# Patient Record
Sex: MALE | Race: WHITE
[De-identification: names, ages, dates, MRNs, and addresses within clinical notes are randomized per-mention and may not be internally consistent; named-entity substitution may affect disease eponyms.]

---

## 2020-08-05 ENCOUNTER — HOSPITAL ENCOUNTER (EMERGENCY)
Dept: HOSPITAL 75 - ER | Age: 30
Discharge: HOME | End: 2020-08-05
Payer: SELF-PAY

## 2020-08-05 VITALS — SYSTOLIC BLOOD PRESSURE: 129 MMHG | DIASTOLIC BLOOD PRESSURE: 85 MMHG

## 2020-08-05 VITALS — HEIGHT: 68.11 IN | WEIGHT: 276.68 LBS | BODY MASS INDEX: 41.93 KG/M2

## 2020-08-05 DIAGNOSIS — F41.9: Primary | ICD-10-CM

## 2020-08-05 DIAGNOSIS — E66.9: ICD-10-CM

## 2020-08-05 LAB
ALBUMIN SERPL-MCNC: 4.6 GM/DL (ref 3.2–4.5)
ALP SERPL-CCNC: 65 U/L (ref 40–136)
ALT SERPL-CCNC: 48 U/L (ref 0–55)
BASOPHILS # BLD AUTO: 0 10^3/UL (ref 0–0.1)
BASOPHILS NFR BLD AUTO: 0 % (ref 0–10)
BILIRUB SERPL-MCNC: 0.5 MG/DL (ref 0.1–1)
BUN/CREAT SERPL: 20
CALCIUM SERPL-MCNC: 9.4 MG/DL (ref 8.5–10.1)
CHLORIDE SERPL-SCNC: 106 MMOL/L (ref 98–107)
CO2 SERPL-SCNC: 20 MMOL/L (ref 21–32)
CREAT SERPL-MCNC: 0.9 MG/DL (ref 0.6–1.3)
EOSINOPHIL # BLD AUTO: 0.1 10^3/UL (ref 0–0.3)
EOSINOPHIL NFR BLD AUTO: 1 % (ref 0–10)
ERYTHROCYTE [DISTWIDTH] IN BLOOD BY AUTOMATED COUNT: 12.9 % (ref 10–14.5)
GFR SERPLBLD BASED ON 1.73 SQ M-ARVRAT: > 60 ML/MIN
GLUCOSE SERPL-MCNC: 95 MG/DL (ref 70–105)
HCT VFR BLD CALC: 45 % (ref 40–54)
HGB BLD-MCNC: 15 G/DL (ref 13.3–17.7)
LYMPHOCYTES # BLD AUTO: 1.6 X 10^3 (ref 1–4)
LYMPHOCYTES NFR BLD AUTO: 18 % (ref 12–44)
MANUAL DIFFERENTIAL PERFORMED BLD QL: NO
MCH RBC QN AUTO: 29 PG (ref 25–34)
MCHC RBC AUTO-ENTMCNC: 34 G/DL (ref 32–36)
MCV RBC AUTO: 85 FL (ref 80–99)
MONOCYTES # BLD AUTO: 0.7 X 10^3 (ref 0–1)
MONOCYTES NFR BLD AUTO: 8 % (ref 0–12)
NEUTROPHILS # BLD AUTO: 6.5 X 10^3 (ref 1.8–7.8)
NEUTROPHILS NFR BLD AUTO: 73 % (ref 42–75)
PLATELET # BLD: 193 10^3/UL (ref 130–400)
PMV BLD AUTO: 11 FL (ref 7.4–10.4)
POTASSIUM SERPL-SCNC: 3.6 MMOL/L (ref 3.6–5)
PROT SERPL-MCNC: 8 GM/DL (ref 6.4–8.2)
SODIUM SERPL-SCNC: 138 MMOL/L (ref 135–145)
WBC # BLD AUTO: 9 10^3/UL (ref 4.3–11)

## 2020-08-05 PROCEDURE — 85379 FIBRIN DEGRADATION QUANT: CPT

## 2020-08-05 PROCEDURE — 93005 ELECTROCARDIOGRAM TRACING: CPT

## 2020-08-05 PROCEDURE — 36415 COLL VENOUS BLD VENIPUNCTURE: CPT

## 2020-08-05 PROCEDURE — 85025 COMPLETE CBC W/AUTO DIFF WBC: CPT

## 2020-08-05 PROCEDURE — 71045 X-RAY EXAM CHEST 1 VIEW: CPT

## 2020-08-05 PROCEDURE — 80053 COMPREHEN METABOLIC PANEL: CPT

## 2020-08-05 PROCEDURE — 84484 ASSAY OF TROPONIN QUANT: CPT

## 2020-08-05 NOTE — XMS REPORT
[facility]

                             Created on: 2017



Alex Ness

External Reference #: [id number]

: 1990

Sex: Male



Demographics





                          Address                   [address] [place]  [address]

 

                          Preferred Language        Unknown

 

                          Marital Status            Unknown

 

                          Spiritism Affiliation     Unknown

 

                          Race                      Unknown

 

                          Ethnic Group              Unknown





Author





                          Author                    Alex BOYD

 

                          [facility]

 

                          Address                   [address] [place]  [address]



 

                          Phone                     [phone]







Care Team Providers





                    Care Team Member Name Role                Phone

 

                    ANDRES BOYD       Unavailable         [phone]







PROBLEMS





          Type      Condition ICD9-CM Code HXR33-WP Code Onset Dates Condition S

tatus SNOMED 

Code

 

          Problem   Snoring             R06.83              Active    81732541

 

          Problem   Family history of diabetes mellitus           Z83.3         

      Active    302629832

 

          Problem   Type 2 diabetes mellitus without complication           E11.

9               Active    44923316

 

          Problem   Elevated liver enzymes           R74.8               Active 

   [id number]

 

          Problem   Daytime hypersomnia           G47.19              Active    

[id number]

 

          Problem   Benign essential hypertension           I10                 

Active    0287814







ALLERGIES

Unknown Allergies



SOCIAL HISTORY

No smoking Hx information available



PLAN OF CARE





VITAL SIGNS





MEDICATIONS

Unknown Medications



RESULTS

No Results



PROCEDURES

No Known procedures



IMMUNIZATIONS

No Known Immunizations

## 2020-08-05 NOTE — XMS REPORT
[facility]

                             Created on: 2019



Alex Ness

External Reference #: [id number]

: 1990

Sex: Male



Demographics





                          Address                   [address] [place]  [address]

 

                          Preferred Language        Unknown

 

                          Marital Status            Unknown

 

                          Evangelical Affiliation     Unknown

 

                          Race                      Unknown

 

                          Ethnic Group              Unknown





Author





                          Author                    Alex MENJIVAR

 

                          Organization              [facility] [place]

 

                          Address                   [address] [place]  [address]



 

                          Phone                     [phone]







Care Team Providers





                    Care Team Member Name Role                Phone

 

                    KEVIN MENJIVAR     Unavailable         [phone]







PROBLEMS





          Type      Condition ICD9-CM Code AKC17-TQ Code Onset Dates Condition S

tatus SNOMED 

Code

 

          Problem   Snoring             R06.83              Active    63737381

 

          Problem   Hypertriglyceridemia           E78.1               Active   

 636607164

 

          Problem   Elevated liver enzymes           R74.8               Active 

   135268843

 

          Problem   Type 2 diabetes mellitus without complication           E11.

9               Active    99130168

 

          Problem   Benign essential hypertension           I10                 

Active    7346704

 

          Problem   Daytime hypersomnia           G47.19              Active    

[id number]







ALLERGIES

No Information



ENCOUNTERS





                Encounter       Location        Date            Diagnosis

 

                          [facility] [place]     [address] [facility] [id number]

[address] [place] [address]

                          05 Sep, 2019               

 

                          [facility] [place]     [address] [facility] [id number]

[address] [place] [address]

                          23 Aug, 2019               

 

                          [facility] [place]     [address] [facility] [id number]

[address] [place] [address]

                          19 Aug, 2019               

 

                          [facility] [place]     [address] [facility] [id number]

[address] [place] [address]

                          13 Aug, 2019              Hypertriglyceridemia E78.1

 

                          [facility] [place]     [address] [facility] [id number]

[address] [place] [address]

                          06 Aug, 2019               

 

                          [facility] [place]     [address] [facility] [id number]

[address] [place] [address]

                          02 Aug, 2019              Type 2 diabetes mellitus wit

hout complication E11.9 ; Fatigue 

R53.83 ; Morbid obesity E66.01 and Benign essential hypertension I10

 

                          [facility] [place]     [address] [facility] [id number]

[address] [place] [address]

                                         

 

                          [facility] [place]     [address] [address]

                                        Type 2 diabetes mellitus wit

hout complication E11.9 and Fatigue 

R53.83

 

                          [place] [address] [address]

                                         

 

                          [place] [address] [address]

                                         

 

                          [facility]  [address] 

[address]                07 Mar, 2019              Strep pharyngitis J02.0 ; Si

de pain R10.9 and Morbid 

obesity E66.01

 

                          [place] [address] [address]

                          03 2018              Coughing R05 ; Type 2 diabet

es mellitus without complication E11.9 

; Benign essential hypertension I10 ; Right otitis media with effusion H65.91 ; 
Acute upper respiratory infection, unspecified J06.9 and BMI 45.0-49.9, adult 
Z68.42

 

                          [facility] [place] [address] 

[address]                04 2017              Sore throat J02.9 ; Strep ph

aryngitis J02.0 and BMI 

40.0-44.9, adult Z68.41

 

                          [place] [address] [address]

                          03 Oct, 2017              Type 2 diabetes mellitus wit

hout complication E11.9 and Benign 

essential hypertension I10

 

                          [place] [address] [address]

                          15 May, 2017               

 

                          [place] [address] [address]

                                         

 

                          [facility]  [address] 

[address]                              Pharyngitis due to other org

anism J02.8

 

                          [place] [address] [address]

                                         

 

                          [facility] [place]     [address] [facility] [id number]

[address] [place] [address]

                                         

 

                          [facility] [place]     [address] [facility] [id number]

[address] [place] [address]

                                         

 

                          [facility] [place]     [address] [facility] [id number]

[address] [place] [address]

                                         

 

                          [facility] [place]     [address] [facility] [id number]

[address] [place] [address]

                                        Elevated liver enzymes R74.8

 

                          [facility] [place]     [address] [facility] [id number]

[address] [place] [address]

                                         

 

                          [facility] [place]     [address] [place] [id number]

[address] [place] [address]

                                         

 

                          [facility] [place]     [address] [place] [id number]

[address] [place] [address]

                                        Benign essential hypertensio

n I10 ; Type 2 diabetes mellitus 

without complication E11.9 and Elevated liver enzymes R74.8

 

                          [facility]  [address] [facility] [id number]

[address] [place] [address] 

[address]                               

 

                          [facility] [place]     [address] [facility] [id number]

[address] [place] [address]

                                         

 

                          [facility] [place]     [address] [facility] [id number]

[address] [place] [address]

                                        Elevated liver enzymes R74.8

 and Type 2 diabetes mellitus without 

complication E11.9

 

                          [facility] [place]     [address] [facility] [id number]

[address] [place] [address]

                          29 Mar, 2016              Benign essential hypertensio

n I10 ; Fatigue R53.83 ; Family history

of diabetes mellitus Z83.3 ; Snoring R06.83 and Daytime hypersomnia G47.19

 

                          [facility]  [address] [facility] [id number]

[address] [place] [address] 

[address]                09 Mar, 2016              Benign essential hypertensio

n I10 ; Vomiting R11.10 ; 

Bronchitis J40 ; Headache R51 and Strep throat J02.0

 

                          [facility]  [address] [id number]

[address] [place] [address], [place] 

[address]                10 Dec, 2015              Acute bronchitis J20.9 and H

[place] (hypertension) I10

 

                          [facility] [place]     [address] [id number]

[address] [place] [address], [place] [address]

                          20 Aug, 2015               

 

                          [facility] [place]     [address] [id number]

[address] [place] [address], [place] [address]

                                         

 

                          [facility] [place]     [address] [id number]

[address] [place] [address], [place] [address]

                                         

 

                          [facility] [place]     [address] [id number]

[address] [place] [address], [place] [address]

                          28 Oct, 2014               

 

                          [facility] [place]     [address] [id number]

[address] [place] [address], [place] [address]

                          28 Oct, 2014               

 

                          [facility] [place]     [address] [id number]

[address] [place] [address], [place] [address]

                          19 Sep, 2014               

 

                          [facility] [place]     [address] [id number]

[address] [place] [address], [place] [address]

                          19 Sep, 2014               

 

                          [facility] [place]     [address] [id number]

[address] [place] [address], [place] [address]

                          27 Aug, 2014               

 

                          [facility] [place]     [address] [id number]

[address] [place] [phone]

                          27 Aug, 2014               

 

                          [facility] [place]     [address] [id number]

[address] [place] [address], [place] [address]

                          27 Aug, 2014               

 

                          [facility] [place]     [address] [id number]

[address] [place] [address]

                          27 Aug, 2014               

 

                          [facility] [place]     [address] [id number]

[address] [place] [address]

                                         

 

                          [facility] [place]     [address] [id number]

[address] [place] [address]

                                         

 

                          [facility] [place]     [address] [id number]

[address] [place] [address]

                                         

 

                          [facility] [place]     [address] [facility] [id number]

[address] [place] [address]

                                         

 

                          [facility] [place]     [address] [facility] [id number]

[address] [place] [address]

                                         







IMMUNIZATIONS

No Known Immunizations



SOCIAL HISTORY

Never Assessed



REASON FOR VISIT





PLAN OF CARE





VITAL SIGNS





MEDICATIONS

Unknown Medications



RESULTS

No Results



PROCEDURES

No Known procedures



INSTRUCTIONS





MEDICATIONS ADMINISTERED

No Known Medications



MEDICAL (GENERAL) HISTORY





                    Type                Description         Date

 

                    Medical History     HTN                  

 

                    Medical History     Diabetes Mellitus 3/2016 A1C 9.9  

 

                    Medical History     Elevated liver enzymes  

 

                    Surgical History    No Surgical history information

## 2020-08-05 NOTE — XMS REPORT
[facility]

                             Created on: 05/15/2020



Alex Ness

External Reference #: [id number]

: 1990

Sex: Male



Demographics





                          Address                   [address] [place]  [address]

 

                          Preferred Language        Unknown

 

                          Marital Status            Unknown

 

                          Catholic Affiliation     Unknown

 

                          Race                      Unknown

 

                          Ethnic Group              Unknown





Author





                          Author                    Alex Mayorga Doctor

 

                          Organization              [facility]

 

                          Address                   Unknown

 

                          Phone                     Unavailable







Care Team Providers





                    Care Team Member Name Role                Phone

 

                    Migration,  Doctor  Unavailable         Unavailable







PROBLEMS





          Type      Condition ICD9-CM Code JYO37-UH Code Onset Dates Condition S

tatus SNOMED 

Code

 

          Problem   Elevated liver enzymes           R74.8               Active 

   220085281

 

          Problem   Hypertriglyceridemia           E78.1               Active   

 [id number]

 

          Problem   Obesity, morbid, BMI 40.0-49.9           E66.01             

 Active    [id number]

 

          Problem   Type 2 diabetes mellitus without complication           E11.

9               Active    30673062

 

          Problem   Benign essential hypertension           I10                 

Active    1013035

 

          Problem   Daytime hypersomnia           G47.19              Active    

[id number]

 

          Problem   Snoring             R06.83              Active    12549228







ALLERGIES

No Information



ENCOUNTERS





                Encounter       Location        Date            Diagnosis

 

                          [facility] [place]     [id number]

[address] [place] [address]

                                         

 

                          [facility] [place] [place] [facility]     [id number]

[address] [place] [address]

                          07 May, 2020               

 

                          [facility] [place] [place] [facility]     [id number]

[address] [place] [address]

                                        Type 2 diabetes mellitus wit

hout complication E11.9 ; Benign 

essential hypertension I10 ; Hypertriglyceridemia E78.1 and Obesity, morbid, BMI
40.0-49.9 E66.01

 

                          [facility] [place] [place] [facility]     [id number]

[address] [place] [address]

                                        Type 2 diabetes mellitus wit

hout complication E11.9 and Benign 

essential hypertension I10

 

                          [facility] [place] [place] [facility]     [id number]

[address] [place] [address]

                                         

 

                          [facility] [place]     [id number]

[address] [place] [address]

                                         

 

                          [facility] [place]     [id number]

[address] [place] [address]

                          05 Sep, 2019               

 

                          [facility] [place] [place] [facility]     [address] [facility] [id number]

[address] [place] [address]

                          23 Aug, 2019               

 

                          [facility] [place]     [address] [facility] [id number]

[address] [place] [address]

                          19 Aug, 2019               

 

                          [facility] [place]     [address] [facility] [id number]

[address] [place] [address]

                          13 Aug, 2019              Hypertriglyceridemia E78.1

 

                          [facility] [place]     [address] [facility] [id number]

[address] [place] [address]

                          06 Aug, 2019               

 

                          [facility] [place]     [address] [facility] [id number]

[address] [place] [address]

                          02 Aug, 2019              Type 2 diabetes mellitus wit

hout complication E11.9 ; Fatigue 

R53.83 ; Morbid obesity E66.01 and Benign essential hypertension I10

 

                          [address] [facility] [id number]

[address] [place] [address]

                                         

 

                          [facility] [place]     [id number]

[address] [place] [address]

                                        Type 2 diabetes mellitus wit

hout complication E11.9 and Fatigue 

R53.83

 

                          [facility] [place]     [address] [facility] [id number]

[address] [place] [address]

                                         

 

                          [facility] [place]     [id number]

[address] [place] [address]

                                         

 

                          [facility]  [address] [facility] [id number]

[address] [place] [address] 

[address]                07 Mar, 2019              Strep pharyngitis J02.0 ; Si

de pain R10.9 and Morbid 

obesity E66.01

 

                          [address] [facility] [id number]

[address] [place] [address]

                                        Coughing R05 ; Type 2 diabet

es mellitus without complication E11.9 

; Benign essential hypertension I10 ; Right otitis media with effusion H65.91 ; 
Acute upper respiratory infection, unspecified J06.9 and BMI 45.0-49.9, adult 
Z68.42

 

                          [facility]  [address] [facility] [id number]

[address] [place] [address] 

[address]                              Sore throat J02.9 ; Strep ph

aryngitis J02.0 and BMI 

40.0-44.9, adult Z68.41

 

                          [facility] [place]     [address] [facility] [id number]

[address] [place] [address]

                          03 Oct, 2017              Type 2 diabetes mellitus wit

hout complication E11.9 and Benign 

essential hypertension I10

 

                          [facility] [place]     [address] [facility] [id number]

[address] [place] [address]

                          15 May, 2017               

 

                          [facility] [place]     [address] [facility] [id number]

[address] [place] [address]

                                         

 

                          [facility]  [address] [facility] [id number]

[address] [place] [address] 

[address]                              Pharyngitis due to other org

anism J02.8

 

                          [facility] [place]     [address] [facility] [id number]

[address] [place] [address]

                                         

 

                          [facility] [place]     [id number]

[address] [place] [address]

                                         

 

                          [facility] [place]     [address] [facility] [id number]

[address] [place] [address]

                                         

 

                          [facility] [place]     [id number]

[address] [place] [address]

                                         

 

                          [facility] [place]     [address] [facility] [id number]

[address] [place] [address]

                                        Elevated liver enzymes R74.8

 

                          [facility] [place]     [address] [facility] [id number]

[address] [place] [address]

                                         

 

                          [facility] [place]     [id number]

[address] [place] [address]

                                         

 

                          [facility] [place]     [id number]

[address] [place] [address]

                                        Benign essential hypertensio

n I10 ; Type 2 diabetes mellitus 

without complication E11.9 and Elevated liver enzymes R74.8

 

                          [facility]  [address] [facility] [id number]

[address] [place] [address] 

[address]                               

 

                          [facility] [place]     [address] [address]

                                         

 

                          [facility] [place]     [address] [address]

                                        Elevated liver enzymes R74.8

 and Type 2 diabetes mellitus without 

complication E11.9

 

                          [facility] [place]     [id number] [place] [address]

                          29 Mar, 2016              Benign essential hypertensio

n I10 ; Fatigue R53.83 ; Family history

of diabetes mellitus Z83.3 ; Snoring R06.83 and Daytime hypersomnia G47.19

 

                          [facility] [place] [facility]  [address] 

[address]                09 Mar, 2016              Benign essential hypertensio

n I10 ; Vomiting R11.10 ; 

Bronchitis J40 ; Headache R51 and Strep throat J02.0

 

                          [facility] [place] [facility]  [address] 

[address]                10 Dec, 2015              Acute bronchitis J20.9 and H

TN (hypertension) I10

 

                          [facility] [place]     [address] [address]

                          20 Aug, 2015               

 

                          [facility] [place]     [address] [address]

                                         

 

                          [facility] [place]     [address] [address]

                                         

 

                          [facility] [place]     [address] [address]

                          28 Oct, 2014               

 

                          [facility] [place]     [address] [address]

                          28 Oct, 2014               

 

                          [facility] [place]     [address] [phone]

                          19 Sep, 2014               

 

                          [facility] [place]     [address] [id number]

[address] [place] [address]

                          19 Sep, 2014               

 

                          [facility] [place]     [address] [id number]

[address] [place] [address]

                          27 Aug, 2014               

 

                          [facility] [place]     [address] [id number]

[address] [place] [address]

                          27 Aug, 2014               

 

                          [facility] [place]     [address] [id number]

[address] [place] [address]

                          27 Aug, 2014               

 

                          [facility] [place]     [address] [id number]

[address] [place] [address]

                          27 Aug, 2014               

 

                          [facility] [place]     [address] [id number]

[address] [place] [address]

                                         

 

                          [facility] [place]     [address] [id number]

[address] [place] [address]

                                         

 

                          [facility] [place]     [address] [id number]

[address] [place] [address]

                                         

 

                          [facility] [place]     [address] [id number]

[address] [place] [address]

                                         

 

                          [facility] [place]     [address] [id number]

[address] [place] [address]

                                         







IMMUNIZATIONS

No Known Immunizations



SOCIAL HISTORY

Never Assessed



REASON FOR VISIT





PLAN OF CARE





VITAL SIGNS





                    Height              68 in               2013

 

                    Weight              270.9 lbs           2013

 

                    Temperature         97.6 degrees Fahrenheit 2013

 

                    Heart Rate          78 bpm              2013

 

                    Respiratory Rate    18                  2013

 

                    Blood pressure systolic 160 mmHg            2013

 

                    Blood pressure diastolic 90 mmHg             2013







MEDICATIONS

Unknown Medications



RESULTS

No Results



PROCEDURES





                Procedure       Date Ordered    Result          Body Site

 

                ASSAY THYROID STIM HORMONE 2013                    

 

                LIPID PANEL     2013                    

 

                COMPREHEN METABOLIC PANEL 2013                    

 

                VENIPUNCT, ROUTINE* 2013                    







INSTRUCTIONS





MEDICATIONS ADMINISTERED

No Known Medications



MEDICAL (GENERAL) HISTORY





                    Type                Description         Date

 

                    Medical History     HTN                  

 

                    Medical History     Diabetes Mellitus 3/2016 A1C 9.9  

 

                    Medical History     Elevated liver enzymes  

 

                    Surgical History    No Surgical history information

## 2020-08-05 NOTE — XMS REPORT
[facility]

                             Created on: 2016



Alex Ness

External Reference #: [id number]

: 1990

Sex: Male



Demographics





                          Address                   [address] [place]  [address]

 

                          Home Phone                [phone]

 

                          Preferred Language        Unknown

 

                          Marital Status            Unknown

 

                          Sabianism Affiliation     Unknown

 

                          Race                      

 

                          Ethnic Group               or 





Author





                          Author                    Alex BOYD

 

                          Organization              [employer]

 

                          Address                   Unknown

 

                          Phone                     Unavailable







Care Team Providers





                    Care Team Member Name Role                Phone

 

                    ANDRES BOYD       CP                  Unavailable



                                                                



Allergies

          No Known Allergies                                                    
                                   



Problems

          



             Problem Type Condition    Code         Onset Dates  Condition Statu

s

 

             Problem      Snoring      R06.83                    Active

 

             Problem      Daytime hypersomnia G47.19                    Active

 

             Problem      Family history of diabetes mellitus Z83.3             

        Active

 

             Problem      Type 2 diabetes mellitus without complication E11.9   

                  Active

 

             Problem      Benign essential hypertension I10                     

  Active

 

             Problem      Elevated liver enzymes R74.8                     Activ

e



                                                                                
                                                         



Medications

          No Known Medications                                                  
                           



Results

          No Known Results                                                      
             



Summary Purpose

          [employer] Submission

## 2020-08-05 NOTE — XMS REPORT
[facility]

                             Created on: 06/15/2018



Aelx Ness

External Reference #: [id number]

: 1990

Sex: Male



Demographics





                          Address                   [address] [place]  [address]

 

                          Preferred Language        Unknown

 

                          Marital Status            Unknown

 

                          Anglican Affiliation     Unknown

 

                          Race                      Unknown

 

                          Ethnic Group              Unknown





Author





                          Author                    Alex BOYD

 

                          Organization              [facility]

 

                          Address                   [address] [place]  [address]



 

                          Phone                     [phone]







Care Team Providers





                    Care Team Member Name Role                Phone

 

                    DEB  ANDRES       Unavailable         [phone]







PROBLEMS





          Type      Condition ICD9-CM Code LGR16-RH Code Onset Dates Condition S

tatus SNOMED 

Code

 

          Problem   Snoring             R06.83              Active    95873638

 

          Problem   Daytime hypersomnia           G47.19              Active    

[id number]

 

          Problem   Elevated liver enzymes           R74.8               Active 

   [id number]

 

          Problem   Benign essential hypertension           I10                 

Active    [id number]

 

          Problem   Type 2 diabetes mellitus without complication           E11.

9               Active    [id number]







ALLERGIES

No Known Allergies



ENCOUNTERS





                Encounter       Location        Date            Diagnosis

 

                          [facility]     [id number]

[address] [place] [phone]

                                        Coughing R05 ; Type 2 diabet

es mellitus without complication E11.9 

; Benign essential hypertension I10 ; Right otitis media with effusion H65.91 ; 
Acute upper respiratory infection, unspecified J06.9 and BMI 45.0-49.9, adult 
Z68.42

 

                          [facility]  [address] [id number]

[address] [place] [address] 

[phone]                04 2017              Sore throat J02.9 ; Strep ph

aryngitis J02.0 and BMI 

40.0-44.9, adult Z68.41

 

                          [facility]     [address] [place], [place] [id number]

[address] [place] [phone]

                          03 Oct, 2017              Type 2 diabetes mellitus wit

hout complication E11.9 and Benign 

essential hypertension I10

 

                          [facility]     [address] [place], [place] [id number]

[address] [place] [phone]

                          15 May, 2017               

 

                          [facility]     [address] [id number]

[address] [place] [phone]

                                         

 

                          [facility]  [address] [facility] [id number]

[address] [place] [address] 

[phone]                              Pharyngitis due to other org

anism J02.8

 

                          [facility]     [address] [facility] [id number]

[address] [place] [phone]

                                         

 

                          [facility]     [address] [facility] [id number]

[address] [place] [phone]

                                         

 

                          [facility]     [address] [facility] [id number]

[address] [place] [phone]

                                         

 

                          [facility]     [address] [facility] [id number]

[address] [place] [phone]

                                         

 

                          [facility]     [address] [facility] [id number]

[address] [place] [phone]

                                        Elevated liver enzymes R74.8

 

                          [facility]     [address] [facility] [id number]

[address] [place] [phone]

                                         

 

                          [facility]     [address] [facility] [id number]

[address] [place] [phone]

                                         

 

                          [facility]     [address] [facility] [id number]

[address] [place] [phone]

                                        Benign essential hypertensio

n I10 ; Type 2 diabetes mellitus 

without complication E11.9 and Elevated liver enzymes R74.8

 

                          [facility]  [address] [facility] [id number]

[address] [place] [address] 

[phone]                               

 

                          [facility]     [address] [facility] [id number]

[address] [place] [phone]

                                         

 

                          [facility]     [address] [facility] [id number]

[address] [place] [phone]

                                        Elevated liver enzymes R74.8

 and Type 2 diabetes mellitus without 

complication E11.9

 

                          [facility]     [address] [facility] [id number]

[address] [place] [phone]

                          29 Mar, 2016              Benign essential hypertensio

n I10 ; Fatigue R53.83 ; Family history

of diabetes mellitus Z83.3 ; Snoring R06.83 and Daytime hypersomnia G47.19

 

                          [facility]  [address] [id number]

[address] [place] [address] 

[phone]                09 Mar, 2016              Benign essential hypertensio

n I10 ; Vomiting R11.10 ; 

Bronchitis J40 ; Headache R51 and Strep throat J02.0

 

                          [facility]  [address] [id number]

[address] [place] [address] 

[phone]                10 Dec, 2015              Acute bronchitis J20.9 and H

TN (hypertension) I10

 

                          [facility]     [address] [id number]

[address] [place] [phone]

                          20 Aug, 2015               

 

                          [facility]     [address] [id number]

[address] [place] [phone]

                                         

 

                          [facility]     [address] [facility] [id number]

[address] [place] [phone]

                                         

 

                          [facility]     [address] [facility] [id number]

[address] [place] [phone]

                          28 Oct, 2014               

 

                          [facility]     [address] [id number]

[address] [place] [phone]

                          28 Oct, 2014               

 

                          [facility]     [address] [id number]

[address] [place] [phone]

                          19 Sep, 2014               

 

                          [facility]     [address] [facility] [id number]

[address] [place] [phone]

                          19 Sep, 2014               

 

                          [facility]     [address] [id number]

[address] [place] [phone]

                          27 Aug, 2014               

 

                          [facility]     [address] [id number]

[address] [place] [phone]

                          27 Aug, 2014               

 

                          [facility]     [address] [facility] [id number]

[address] [place] [phone]

                          27 Aug, 2014               

 

                          [facility]     [address] [id number]

[address] [place] [phone]

                          27 Aug, 2014               

 

                          [facility]     [address] [facility] [id number]

[address] [place] [phone]

                                         

 

                          [facility]     [address] [facility] [id number]

[address] [place] [phone]

                                         

 

                          [facility]     [address] [facility] [id number]

[address] [place] [phone]

                                         

 

                          [facility]     [address] [facility] [id number]

[address] [place] [phone]

                                         

 

                          [facility]     [address] [facility] [id number]

[address] [place] [phone]

                                         







IMMUNIZATIONS

No Known Immunizations



SOCIAL HISTORY

Never Assessed



REASON FOR VISIT

cough--CarleyleachMA, Chest pain as well , Refill on Lisinopril and Metformin



PLAN OF CARE





                          Activity                  Details

 

                                         

 

                          Follow Up                 3 Months Reason:DM HTN







VITAL SIGNS





                    Height              68 in               2018

 

                    Weight              298.0 lbs           2018

 

                    Temperature         98.8 degrees Fahrenheit 2018

 

                    Heart Rate          90 bpm              2018

 

                    Respiratory Rate    20                  2018

 

                    BMI                 45.31 kg/m2         2018

 

                    Blood pressure systolic 136 mmHg            2018

 

                    Blood pressure diastolic 88 mmHg             2018







MEDICATIONS





        Medication Instructions Dosage  Frequency Start Date End Date Duration S

tatus

 

          Augmentin 875-125 MG Orally every 12 hrs 1 tablet  12h       , 2

018 10 [place], 2018 

07 days                                 Active

 

        MetFORMIN HCl  mg Orally twice a day 2 tablets 12h     16 [place]ato, 201

6         28 days 

Active

 

                    Glucocard Expression Test Test Strips In Vitro 2 times a day

; and as needed as 

directed                                30 days      Not-Taking

 

             Promethazine-Codeine 6.25-10 MG/5ML Orally every 6 hrs 5 ml as need

ed 6h                                                                   Not-Taking

 

        Lisinopril 20 mg Orally Once a day 1 tablet 24h                     30 d

ays Active







RESULTS





                Name            Result          Date            Reference Range

 

                INFLUENZA A & B (IN HOUSE)                 2018       

 

                INFLUENZA A     neg                              

 

                INFLUENZA B     neg                              

 

                Control         +                                

 

                Lot #           [id number]                          

 

                Exp date        3/2020                           

 

                A1C (IN HOUSE)                  2018       

 

                A1C IN HOUSE    6.2                             4.3 - 5.6 %

 

                Previous A1c    9.9                              

 

                Lot             [id number]                             

 

                Exp date        2019                          







PROCEDURES





                Procedure       Date Ordered    Result          Body Site

 

                INFLUENZA ASSAY W/OPTIC 2018                     

 

                GLYCATED HEMOGLOBIN TEST 2018                     







INSTRUCTIONS





MEDICATIONS ADMINISTERED

No Known Medications



MEDICAL (GENERAL) HISTORY





                    Type                Description         Date

 

                    Medical History     HTN                  

 

                    Medical History     Diabetes Mellitus 3/2016 A1C 9.9  

 

                    Medical History     Elevated liver enzymes

## 2020-08-05 NOTE — XMS REPORT
[facility]

                             Created on: 2019



Alex Ness

External Reference #: [id number]

: 1990

Sex: Male



Demographics





                          Address                   [address] [place]  [address]

 

                          Preferred Language        Unknown

 

                          Marital Status            Unknown

 

                          Restorationist Affiliation     Unknown

 

                          Race                      Unknown

 

                          Ethnic Group              Unknown





Author





                          Author                    Alex [place]

 

                          Organization              [facility]

 

                          Address                   [address] [place]  [address]



 

                          Phone                     [phone]







Care Team Providers





                    Care Team Member Name Role                Phone

 

                    GETACHEW [place]      Unavailable         [phone]







PROBLEMS





          Type      Condition ICD9-CM Code RJR69-ZW Code Onset Dates Condition S

tatus SNOMED 

Code

 

          Problem   Snoring             R06.83              Active    [id number]

 

          Problem   Hypertriglyceridemia           E78.1               Active   

 [id number]

 

          Problem   Elevated liver enzymes           R74.8               Active 

   [id number]

 

          Problem   Type 2 diabetes mellitus without complication           E11.

9               Active    08403126

 

          Problem   Benign essential hypertension           I10                 

Active    1887080

 

          Problem   Daytime hypersomnia           G47.19              Active    

[id number]







ALLERGIES

No Information



ENCOUNTERS





                Encounter       Location        Date            Diagnosis

 

                          [facility]     [address] [facility] [id number]

[address] [place] [address]

                          23 Aug, 2019               

 

                          [facility]     [address] [facility] [id number]

[address] [place] [address]

                          19 Aug, 2019               

 

                          [facility]     [address] [facility] [id number]

[address] [place] [address]

                          13 Aug, 2019              Hypertriglyceridemia E78.1

 

                          [facility]     [address] [facility] [id number]

[address] [place] [address]

                          06 Aug, 2019               

 

                          [facility]     [address] [place] [id number]

[address] [place] [address]

                          02 Aug, 2019              Type 2 diabetes mellitus wit

hout complication E11.9 ; Fatigue 

R53.83 ; Morbid obesity E66.01 and Benign essential hypertension I10

 

                          [facility]     [address] [facility] [id number]

[address] [place] [address]

                                         

 

                          [facility]     [address] [facility] [id number]

[address] [place] [address]

                                        Type 2 diabetes mellitus wit

hout complication E11.9 and Fatigue 

R53.83

 

                          [place] [address] [address]

                                         

 

                          [place] [address] [address]

                                         

 

                          [facility] [place] [address] 

[address]                07 Mar, 2019              Strep pharyngitis J02.0 ; Si

de pain R10.9 and Morbid 

obesity E66.01

 

                          [place] [address] [address]

                                        Coughing R05 ; Type 2 diabet

es mellitus without complication E11.9 

; Benign essential hypertension I10 ; Right otitis media with effusion H65.91 ; 
Acute upper respiratory infection, unspecified J06.9 and BMI 45.0-49.9, adult 
Z68.42

 

                          [facility] [place] [address] 

[address]                              Sore throat J02.9 ; Strep ph

aryngitis J02.0 and BMI 

40.0-44.9, adult Z68.41

 

                          [place] [address] [address]

                          03 Oct, 2017              Type 2 diabetes mellitus wit

hout complication E11.9 and Benign 

essential hypertension I10

 

                          [place] [address] [address]

                          15 May, 2017               

 

                          [place] [address] [address]

                                         

 

                          [facility]  [address] 

[address]                              Pharyngitis due to other org

anism J02.8

 

                          [place] [address] [address]

                                         

 

                          [place] [address] [address]

                                         

 

                          [facility]     [address] [place] [id number]

[address] [place] [address]

                                         

 

                          [facility]     [address] [address]

                                         

 

                          [facility]     [address] [address]

                                        Elevated liver enzymes R74.8

 

                          [facility]     [address] [address]

                                         

 

                          [facility]     [address] [address]

                                         

 

                          [facility]     [address] [address]

                                        Benign essential hypertensio

n I10 ; Type 2 diabetes mellitus 

without complication E11.9 and Elevated liver enzymes R74.8

 

                          [facility] IN [place] [address] 

[address]                               

 

                          [facility]     [address] [address]

                                         

 

                          [facility]     [address] [address]

                                        Elevated liver enzymes R74.8

 and Type 2 diabetes mellitus without 

complication E11.9

 

                          [facility]     [address] [address]

                          29 Mar, 2016              Benign essential hypertensio

n I10 ; Fatigue R53.83 ; Family history

of diabetes mellitus Z83.3 ; Snoring R06.83 and Daytime hypersomnia G47.19

 

                          [facility] IN [address] 

[address]                09 Mar, 2016              Benign essential hypertensio

n I10 ; Vomiting R11.10 ; 

Bronchitis J40 ; Headache R51 and Strep throat J02.0

 

                          [facility]  [address] [place] [address] 

[address]                10 Dec, 2015              Acute bronchitis J20.9 and H

TN (hypertension) I10

 

                          [facility]     [address] [id number]

[address] [place] [address], [place] [address]

                          20 Aug, 2015               

 

                          [facility]     [address] [id number]

[address] [place] [address], [place] [address]

                                         

 

                          [facility]     [address] [id number]

[address] [place] [address], [place] [address]

                                         

 

                          [facility]     [address] [id number]

[address] [place] [address], [place] [address]

                          28 Oct, 2014               

 

                          [facility]     [address] [id number]

[address] [place] [address], [place] [address]

                          28 Oct, 2014               

 

                          [facility]     [address] [id number]

[address] [place] [address], [place] [address]

                          19 Sep, 2014               

 

                          [facility]     [address] [id number]

[address] [place] [address], [place] [address]

                          19 Sep, 2014               

 

                          [facility]     [address] [id number]

[address] [place] [address], [place] [address]

                          27 Aug, 2014               

 

                          [facility]     [address] [id number]

[address] [place] [address], [place] [address]

                          27 Aug, 2014               

 

                          [facility]     [address] [id number]

[address] [place] [address], [place] [address]

                          27 Aug, 2014               

 

                          [facility]     [address] [id number]

[address] [place] [address], [place] [address]

                          27 Aug, 2014               

 

                          [facility]     [address] [id number]

[address] [place] [address]

                                         

 

                          [facility]     [address] [id number]

[address] [place] [address], [place] [address]

                                         

 

                          [facility]     [address] [id number]

[address] [place] [address], [place] [address]

                                         

 

                          [facility]     [address] [id number]

[address] [place] [address]

                                         

 

                          [facility]     [address] [facility] [id number]

[address] [place] [address]

                                         







IMMUNIZATIONS

No Known Immunizations



SOCIAL HISTORY

Never Assessed



REASON FOR VISIT





PLAN OF CARE





VITAL SIGNS





MEDICATIONS

Unknown Medications



RESULTS

No Results



PROCEDURES

No Known procedures



INSTRUCTIONS





MEDICATIONS ADMINISTERED

No Known Medications



MEDICAL (GENERAL) HISTORY





                    Type                Description         Date

 

                    Medical History     HTN                  

 

                    Medical History     Diabetes Mellitus 3/2016 A1C 9.9  

 

                    Medical History     Elevated liver enzymes  

 

                    Surgical History    No Surgical history information

## 2020-08-05 NOTE — XMS REPORT
[facility]

                             Created on: 2019



Alex Ness

External Reference #: [id number]

: 1990

Sex: Male



Demographics





                          Address                   [address] [place]  [address]

 

                          Preferred Language        Unknown

 

                          Marital Status            Unknown

 

                          Yazidi Affiliation     Unknown

 

                          Race                      Unknown

 

                          Ethnic Group              Unknown





Author





                          Author                    Alex Mayorga Doctor

 

                          Organization              [facility]

 

                          Address                   Unknown

 

                          Phone                     Unavailable







Care Team Providers





                    Care Team Member Name Role                Phone

 

                    Migration,  Doctor  Unavailable         Unavailable







PROBLEMS





          Type      Condition ICD9-CM Code OAV48-UW Code Onset Dates Condition S

tatus SNOMED 

Code

 

          Problem   Daytime hypersomnia           G47.19              Active    

[id number]

 

          Problem   Snoring             R06.83              Active    86268291

 

          Problem   Elevated liver enzymes           R74.8               Active 

   [id number]

 

          Problem   Type 2 diabetes mellitus without complication           E11.

9               Active    77770628

 

          Problem   Benign essential hypertension           I10                 

Active    5917436







ALLERGIES

No Information



ENCOUNTERS





                Encounter       Location        Date            Diagnosis

 

                          [place] [address] [address]

                                         

 

                          [facility] [place] [facility]  [address] 

[address]                07 Mar, 2019              Strep pharyngitis J02.0 ; Si

de pain R10.9 and Morbid 

obesity E66.01

 

                          [place] [address] [address]

                          03 2018              Coughing R05 ; Type 2 diabet

es mellitus without complication E11.9 

; Benign essential hypertension I10 ; Right otitis media with effusion H65.91 ; 
Acute upper respiratory infection, unspecified J06.9 and BMI 45.0-49.9, adult 
Z68.42

 

                          [facility] [place] [facility]  [address] 

[address]                04 2017              Sore throat J02.9 ; Strep ph

aryngitis J02.0 and BMI 

40.0-44.9, adult Z68.41

 

                          [facility]     [address] [place] [id number]

[address] [place] [address]

                          03 Oct, 2017              Type 2 diabetes mellitus wit

hout complication E11.9 and Benign 

essential hypertension I10

 

                          [place] [address] [place] [address]

                          15 May, 2017               

 

                          [facility]     [address] [id number]

[address] [place] [address]

                                         

 

                          [facility]  [address] [id number]

[address] [place] [address] 

[address]                              Pharyngitis due to other org

anism J02.8

 

                          [facility]     [address] [id number]

[address] [place] [address]

                                         

 

                          [facility]     [address] [id number]

[address] [place] [address]

                                         

 

                          [facility]     [address] [id number]

[address] [place] [address]

                                         

 

                          [facility]     [address] [id number]

[address] [place] [address]

                                         

 

                          [facility]     [address] [id number]

[address] [place] [address]

                                        Elevated liver enzymes R74.8

 

                          [facility]     [address] [id number]

[address] [place] [address]

                                         

 

                          [facility]     [address] [id number]

[address] [place] [address]

                                         

 

                          [facility]     [address] [id number]

[address] [place] [address]

                                        Benign essential hypertensio

n I10 ; Type 2 diabetes mellitus 

without complication E11.9 and Elevated liver enzymes R74.8

 

                          [facility]  [address] [id number]

[address] [place] [address], [place] 

[address]                               

 

                          [facility]     [address] [id number]

[address] [place] [address]

                                         

 

                          [facility]     [address] [facility] [id number]

[address] [place] [address]

                                        Elevated liver enzymes R74.8

 and Type 2 diabetes mellitus without 

complication E11.9

 

                          [facility]     [address] [id number]

[address] [place] [address]

                          29 Mar, 2016              Benign essential hypertensio

n I10 ; Fatigue R53.83 ; Family history

of diabetes mellitus Z83.3 ; Snoring R06.83 and Daytime hypersomnia G47.19

 

                          [facility]  [address] [facility] [id number]

[address] [place] [address] 

[address]                09 Mar, 2016              Benign essential hypertensio

n I10 ; Vomiting R11.10 ; 

Bronchitis J40 ; Headache R51 and Strep throat J02.0

 

                          [facility]  [address] [facility] [id number]

[address] [place] [address] 

[address]                10 Dec, 2015              Acute bronchitis J20.9 and H

TN (hypertension) I10

 

                          [facility]     [address] [facility] [id number]

[address] [place] [address]

                          20 Aug, 2015               

 

                          [facility]     [address] [place] [id number]

[address] [place] [address]

                                         

 

                          [facility]     [address] [facility] [id number]

[address] [place] [address]

                                         

 

                          [facility]     [address] [place] [id number]

[address] [place] [address]

                          28 Oct, 2014               

 

                          [facility]     [address] [facility] [id number]

[address] [place] [address]

                          28 Oct, 2014               

 

                          [facility]     [address] [facility] [id number]

[address] [place] [address]

                          19 Sep, 2014               

 

                          [facility]     [address] [facility] [id number]

[address] [place] [address]

                          19 Sep, 2014               

 

                          [facility]     [address] [facility] [id number]

[address] [place] [address]

                          27 Aug, 2014               

 

                          [facility]     [address] [facility] [id number]

[address] [place] [address]

                          27 Aug, 2014               

 

                          [facility]     [address] [facility] [id number]

[address] [place] [address]

                          27 Aug, 2014               

 

                          [facility]     [address] [facility] [id number]

[address] [place] [address]

                          27 Aug, 2014               

 

                          [facility]     [address] [facility] [id number]

[address] [place] [address]

                                         

 

                          [facility]     [address] [facility] [id number]

[address] [place] [address]

                                         

 

                          [facility]     [address] [facility] [id number]

[address] [place] [address]

                                         

 

                          [facility]     [address] [facility] [id number]

[address] [place] [address]

                                         

 

                          [facility]     [address] [facility] [id number]

[address] [place] [address]

                                         







IMMUNIZATIONS

No Known Immunizations



SOCIAL HISTORY

Never Assessed



REASON FOR VISIT

[facility] [place]



PLAN OF CARE





VITAL SIGNS





MEDICATIONS





        Medication Instructions Dosage  Frequency Start Date End Date Duration S

tatus

 

          promethazine 25 mg           1 tablet by Oral route every 6 hours PRN 

          28 Oct, 2014            

                                        Active

 

             Lisinopril 20 mg              take 2 tablets (40 mg) by oral route 

once daily              19 Sep, 

2014                                                        Active







RESULTS

No Results



PROCEDURES

No Known procedures



INSTRUCTIONS





MEDICATIONS ADMINISTERED

No Known Medications



MEDICAL (GENERAL) HISTORY





                    Type                Description         Date

 

                    Medical History     HTN                  

 

                    Medical History     Diabetes Mellitus 3/2016 A1C 9.9  

 

                    Medical History     Elevated liver enzymes  

 

                    Surgical History    No know Surgical history

## 2020-08-05 NOTE — XMS REPORT
[facility]

                             Created on: 2016



Alex Ness

External Reference #: [id number]

: 1990

Sex: Male



Demographics





                          Address                   [address] [place]  [address]

 

                          Home Phone                [phone]

 

                          Preferred Language        Unknown

 

                          Marital Status            Unknown

 

                          Jain Affiliation     Unknown

 

                          Race                      

 

                          Ethnic Group               or 





Author





                          Author                    Alex BOYD

 

                          Organization              [employer]

 

                          Address                   Unknown

 

                          Phone                     Unavailable







Care Team Providers





                    Care Team Member Name Role                Phone

 

                    ANDRES BOYD       CP                  Unavailable



                                                                



Allergies

          No Known Allergies                                                    
                                   



Problems

          



             Problem Type Condition    Code         Onset Dates  Condition Statu

s

 

             Problem      Snoring      R06.83                    Active

 

             Problem      Daytime hypersomnia G47.19                    Active

 

             Problem      Family history of diabetes mellitus Z83.3             

        Active

 

             Problem      Type 2 diabetes mellitus without complication E11.9   

                  Active

 

             Problem      Benign essential hypertension I10                     

  Active

 

             Problem      Elevated liver enzymes R74.8                     Activ

e



                                                                                
                                                         



Medications

          No Known Medications                                                  
                           



Results

          No Known Results                                                      
             



Summary Purpose

          eClinical[employer] Submission

## 2020-08-05 NOTE — XMS REPORT
[facility]

                             Created on: 2020



Alex Ness

External Reference #: [id number]

: 1990

Sex: Male



Demographics





                          Address                   [address] [place] [address]  [address]

 

                          Preferred Language        Unknown

 

                          Marital Status            Unknown

 

                          Samaritan Affiliation     Unknown

 

                          Race                      Unknown

 

                          Ethnic Group              Unknown





Author





                          Author                    Alex Mayorga Doctor

 

                          Organization              [facility]

 

                          Address                   Unknown

 

                          Phone                     Unavailable







Care Team Providers





                    Care Team Member Name Role                Phone

 

                    Migration,  Doctor  Unavailable         Unavailable







PROBLEMS





          Type      Condition ICD9-CM Code CGR78-BH Code Onset Dates Condition S

tatus SNOMED 

Code

 

          Problem   Elevated liver enzymes           R74.8               Active 

   [id number]

 

          Problem   Hypertriglyceridemia           E78.1               Active   

 [id number]

 

          Problem   Obesity, morbid, BMI 40.0-49.9           E66.01             

 Active    [id number]

 

          Problem   Type 2 diabetes mellitus without complication           E11.

9               Active    [id number]

 

          Problem   Benign essential hypertension           I10                 

Active    [id number]

 

          Problem   Daytime hypersomnia           G47.19              Active    

[id number]

 

          Problem   Snoring             R06.83              Active    20790891







ALLERGIES

No Information



ENCOUNTERS





                Encounter       Location        Date            Diagnosis

 

                          [facility]     [address] [facility] [id number]

[address] [place] [address]

                                         

 

                          [facility]     [address] [facility] [id number]

[address] [place] [address]

                                         

 

                          [facility]     [address] [facility] [id number]

[address] [place] [address]

                                         

 

                          [facility]  [address] [facility] [id number]

[address] [place] [address] 

[address]                              Encounter for laboratory jonn

ting for COVID-19 virus 

Z11.59

 

                          [facility]     [address] [facility] [id number]

[address] [place] [address]

                                         

 

                          [facility]     [address] [facility] [id number]

[address] [place] [address]

                                         

 

                          [facility]     [address] [facility] [id number]

[address] [place] [address]

                          27 May, 2020               

 

                          [facility]     [address] [facility] [id number]

[address] [place] [address]

                          07 May, 2020               

 

                          [facility]     [address] [id number]

[address] [place] [address]

                                        Type 2 diabetes mellitus wit

hout complication E11.9 ; Benign 

essential hypertension I10 ; Hypertriglyceridemia E78.1 and Obesity, morbid, BMI
40.0-49.9 E66.01

 

                          [facility]     [address] [facility] [id number]

[address] [place] [address]

                                        Type 2 diabetes mellitus wit

hout complication E11.9 and Benign 

essential hypertension I10

 

                          [facility]     [address] [id number]

[address] [place] [address]

                                         

 

                          [facility]     [address] [facility] [id number]

[address] [place] [address]

                                         

 

                          [facility]     [address] [facility] [id number]

[address] [place] [address]

                          05 Sep, 2019               

 

                          [facility]     [address] [facility] [id number]

[address] [place] [address]

                          23 Aug, 2019               

 

                          [facility]     [address] [id number]

[address] [place] [address]

                          19 Aug, 2019               

 

                          [facility]     [address] [facility] [id number]

[address] [place] [address]

                          13 Aug, 2019              Hypertriglyceridemia E78.1

 

                          [facility]     [address] [facility] [id number]

[address] [place] [address]

                          06 Aug, 2019               

 

                          [facility]     [address] [facility] [id number]

[address] [place] [address]

                          02 Aug, 2019              Type 2 diabetes mellitus wit

hout complication E11.9 ; Fatigue 

R53.83 ; Morbid obesity E66.01 and Benign essential hypertension I10

 

                          [facility]     [address] [facility] [id number]

[address] [place] [address]

                                         

 

                          [facility]     [address] [facility] [id number]

[address] [place] [address]

                                        Type 2 diabetes mellitus wit

hout complication E11.9 and Fatigue 

R53.83

 

                          [facility]     [address] [address]

                                         

 

                          [place] [address] [address]

                                         

 

                          [facility]  [address] 

[id number]                07 Mar, 2019              Strep pharyngitis J02.0 ; Si

de pain R10.9 and Morbid 

obesity E66.01

 

                          [place] [address] [address]

                          03 2018              Coughing R05 ; Type 2 diabet

es mellitus without complication E11.9 

; Benign essential hypertension I10 ; Right otitis media with effusion H65.91 ; 
Acute upper respiratory infection, unspecified J06.9 and BMI 45.0-49.9, adult 
Z68.42

 

                          [facility] [place] [address] 

[address]                              Sore throat J02.9 ; Strep ph

aryngitis J02.0 and BMI 

40.0-44.9, adult Z68.41

 

                          [place] [address] [address]

                          03 Oct, 2017              Type 2 diabetes mellitus wit

hout complication E11.9 and Benign 

essential hypertension I10

 

                          [place] [address] [address]

                          15 May, 2017               

 

                          [place] [address] [address]

                                         

 

                          [facility]  [address] 

[address]                              Pharyngitis due to other org

anism J02.8

 

                          [place] [address] [address]

                                         

 

                          [place] [address] [address]

                                         

 

                          [place] [address] [place] [id number]

[address] [place] [address]

                                         

 

                          [facility]     [address] [address]

                                         

 

                          [facility]     [address] [place] [id number] [place] [address]

                                        Elevated liver enzymes R74.8

 

                          [facility]     [address] [address]

                                         

 

                          [facility]     [address] [place] [id number]

[address] [place] [address]

                                         

 

                          [facility]     [address] [address]

                                        Benign essential hypertensio

n I10 ; Type 2 diabetes mellitus 

without complication E11.9 and Elevated liver enzymes R74.8

 

                          [facility] [place] [address] 

[address]                               

 

                          [facility]     [address] [place] [id number]

[address] [place] [address]

                                         

 

                          [facility]     [address] [address]

                                        Elevated liver enzymes R74.8

 and Type 2 diabetes mellitus without 

complication E11.9

 

                          [place] [address] [place] [id number]

[address] [place] [address]

                          29 Mar, 2016              Benign essential hypertensio

n I10 ; Fatigue R53.83 ; Family history

of diabetes mellitus Z83.3 ; Snoring R06.83 and Daytime hypersomnia G47.19

 

                          [facility] [address] 

[address]                09 Mar, 2016              Benign essential hypertensio

n I10 ; Vomiting R11.10 ; 

Bronchitis J40 ; Headache R51 and Strep throat J02.0

 

                          [facility] [address] 

[address]                10 Dec, 2015              Acute bronchitis J20.9 and H

TN (hypertension) I10

 

                          [facility]     [address] [id number]

[address] [place] [address], [place] [address]

                          20 Aug, 2015               

 

                          [facility]     [address] [id number]

[address] [place] [address], [place] [address]

                                         

 

                          [facility]     [address] [id number]

[address] [place] [address], [place] [address]

                                         

 

                          [facility]     [address] [id number]

[address] [place] [address], [place] [address]

                          28 Oct, 2014               

 

                          [facility]     [address] [id number]

[address] [place] [address], [place] [address]

                          28 Oct, 2014               

 

                          [facility]     [address] [id number]

[address] [place] [address], [place] [address]

                          19 Sep, 2014               

 

                          [facility]     [address] [id number]

[address] [place] [address], [place] [address]

                          19 Sep, 2014               

 

                          [facility]     [address] [id number]

[address] [place] [address], [place] [address]

                          27 Aug, 2014               

 

                          [facility]     [address] [id number]

[address] [place] [address], [place] [address]

                          27 Aug, 2014               

 

                          [facility]     [address] [id number]

[address] [place] [address], [place] [address]

                          27 Aug, 2014               

 

                          [facility]     [address] [id number]

[address] [place] [address], [place] [address]

                          27 Aug, 2014               

 

                          [facility]     [address] [id number]

[address] [place] [address]

                                         

 

                          [facility]     [address] [id number]

[address] [place] [address]

                                         

 

                          [facility]     [address] [id number]

[address] [place] [address], [place] [address]

                                         

 

                          [facility]     [address] [id number]

[address] [place] [address], [place] [address]

                                         

 

                          [facility]     [address] [id number]

[address] [place] [address]

                                         







IMMUNIZATIONS

No Known Immunizations



SOCIAL HISTORY

Never Assessed



REASON FOR VISIT





PLAN OF CARE





VITAL SIGNS





MEDICATIONS

Unknown Medications



RESULTS

No Results



PROCEDURES

No Known procedures



INSTRUCTIONS





MEDICATIONS ADMINISTERED

No Known Medications



MEDICAL (GENERAL) HISTORY





                    Type                Description         Date

 

                    Medical History     HTN                  

 

                    Medical History     Diabetes Mellitus 3/2016 A1C 9.9  

 

                    Medical History     Elevated liver enzymes  

 

                    Surgical History    No Surgical history information

## 2020-08-05 NOTE — DIAGNOSTIC IMAGING REPORT
INDICATION: Anxiety and chest wall pain.



EXAMINATION: Frontal chest was obtained at 9:34 p.m.



FINDINGS: Heart and mediastinal silhouette are normal in

appearance. Lungs are clear. There is no pneumothorax or pleural

fluid. 



IMPRESSION: Negative chest. 



Dictated by: 



  Dictated on workstation # [id number]

## 2020-08-05 NOTE — ED CHEST PAIN
General


Chief Complaint:  Psych/Social Disorder


Stated Complaint:  PALPATIONS,


Source:  patient


Exam Limitations:  no limitations





History of Present Illness


Date Seen by Provider:  Aug 5, 2020


Time Seen by Provider:  21:07


Initial Comments


To ER with reports of intermittent chest pressure. This occurs about every 10 

minutes and lasts for just a brief second when it comes about. No shortness of 

breath or cough or fevers. He's had these symptoms for quite some time. He saw 

primary care yesterday and was started on buspirone for anxiety. He states that 

he always feels anxious and tired, he feels relaxed now that he is here he 

states.


Timing/Duration:  intermittent


Severity/Quality:  moderate


Radiation:  no radiation


ASA po PTA:  No


NTG SL PTA:  No


Associated Symptoms:  denies symptoms





Allergies and Home Medications


Allergies


Coded Allergies:  


     No Known Drug Allergies (Unverified , 8/5/20)





Patient Home Medication List


Home Medication List Reviewed:  Yes





Review of Systems


Review of Systems


Constitutional:  see HPI


EENTM:  No Symptoms Reported


Respiratory:  No Symptoms Reported


Cardiovascular:  No Symptoms Reported


Gastrointestinal:  No Symptoms Reported


Genitourinary:  No Symptoms Reported


Musculoskeletal:  no symptoms reported


Skin:  no symptoms reported


Psychiatric/Neurological:  See HPI, Anxiety


Endocrine:  No Symptoms Reported





Past Medical-Social-Family Hx


Patient Social History


Recent Foreign Travel:  No


Contact w/Someone Who Travel:  No





Physical Exam


Vital Signs





Vital Signs - First Documented








 8/5/20





 20:57


 


Temp 36.3


 


Pulse 89


 


Resp 12


 


B/P (MAP) 155/96 (115)


 


Pulse Ox 99


 


O2 Delivery Room Air





Capillary Refill :


Height, Weight, BMI


Height: '"


Weight: lbs. oz. kg;  BMI


Method:


General Appearance:  No Apparent Distress, WD/WN, Obese


Neck:  Full Range of Motion


Respiratory:  Normal Breath Sounds, No Accessory Muscle Use, No Respiratory 

Distress


Cardiovascular:  Regular Rate, Rhythm, Normal Peripheral Pulses


Gastrointestinal:  Non Tender, Soft


Neurologic/Psychiatric:  Alert, Oriented x3


Skin:  Normal Color, Warm/Dry





Progress/Results/Core Measures


Results/Orders


Lab Results





Laboratory Tests








Test


 8/5/20


21:15 Range/Units


 


 


White Blood Count


 9.0 


 4.3-11.0


10^3/uL


 


Red Blood Count


 5.26 


 4.35-5.85


10^6/uL


 


Hemoglobin 15.0  13.3-17.7  G/DL


 


Hematocrit 45  40-54  %


 


Mean Corpuscular Volume 85  80-99  FL


 


Mean Corpuscular Hemoglobin 29  25-34  PG


 


Mean Corpuscular Hemoglobin


Concent 34 


 32-36  G/DL





 


Red Cell Distribution Width 12.9  10.0-14.5  %


 


Platelet Count


 193 


 130-400


10^3/uL


 


Mean Platelet Volume 11.0 H 7.4-10.4  FL


 


Neutrophils (%) (Auto) 73  42-75  %


 


Lymphocytes (%) (Auto) 18  12-44  %


 


Monocytes (%) (Auto) 8  0-12  %


 


Eosinophils (%) (Auto) 1  0-10  %


 


Basophils (%) (Auto) 0  0-10  %


 


Neutrophils # (Auto) 6.5  1.8-7.8  X 10^3


 


Lymphocytes # (Auto) 1.6  1.0-4.0  X 10^3


 


Monocytes # (Auto) 0.7  0.0-1.0  X 10^3


 


Eosinophils # (Auto)


 0.1 


 0.0-0.3


10^3/uL


 


Basophils # (Auto)


 0.0 


 0.0-0.1


10^3/uL


 


D-Dimer


 < 0.27 


 0.00-0.49


UG/ML


 


Sodium Level 138  135-145  MMOL/L


 


Potassium Level 3.6  3.6-5.0  MMOL/L


 


Chloride Level 106    MMOL/L


 


Carbon Dioxide Level 20 L 21-32  MMOL/L


 


Anion Gap 12  5-14  MMOL/L


 


Blood Urea Nitrogen 18  7-18  MG/DL


 


Creatinine


 0.90 


 0.60-1.30


MG/DL


 


Estimat Glomerular Filtration


Rate > 60 


  





 


BUN/Creatinine Ratio 20   


 


Glucose Level 95    MG/DL


 


Calcium Level 9.4  8.5-10.1  MG/DL


 


Corrected Calcium   8.5-10.1  MG/DL


 


Total Bilirubin 0.5  0.1-1.0  MG/DL


 


Aspartate Amino Transf


(AST/SGOT) 25 


 5-34  U/L





 


Alanine Aminotransferase


(ALT/SGPT) 48 


 0-55  U/L





 


Alkaline Phosphatase 65    U/L


 


Troponin I < 0.028  <0.028  NG/ML


 


Total Protein 8.0  6.4-8.2  GM/DL


 


Albumin 4.6 H 3.2-4.5  GM/DL








My Orders





Orders - MEAGAN RICKS APRN


Cbc With Automated Diff (8/5/20 21:03)


Troponin I (8/5/20 21:03)


Ekg Tracing (8/5/20 21:03)


Fibrin Degradation Products (8/5/20 21:03)


Comprehensive Metabolic Panel (8/5/20 21:03)


Chest 1 View, Ap/Pa Only (8/5/20 21:03)


Lorazepam Tablet (Ativan Tablet) (8/5/20 21:15)





Medications Given in ED





Current Medications








 Medications  Dose


 Ordered  Sig/Neal


 Route  Start Time


 Stop Time Status Last Admin


Dose Admin


 


 Lorazepam  0.5 mg  ONCE  ONCE


 PO  8/5/20 21:15


 8/5/20 21:16 DC 8/5/20 21:15


0.5 MG








Vital Signs/I&O











 8/5/20





 20:57


 


Temp 36.3


 


Pulse 89


 


Resp 12


 


B/P (MAP) 155/96 (115)


 


Pulse Ox 99


 


O2 Delivery Room Air











Departure


Impression





   Primary Impression:  


   Anxiety


Disposition:  01 HOME, SELF-CARE


Condition:  Stable





Departure-Patient Inst.


Decision time for Depature:  22:04


Referrals:  


[facility]/ISAIAH (PCP)


Primary Care Physician








KEVIN GRIMES (Family)


Primary Care Physician


Patient Instructions:  Anxiety, Adult (DC)





Add. Discharge Instructions:  


1. Return to ER for any concerns


2. Medication as directed


3.





All discharge instructions reviewed with patient and/or family. Voiced 

understanding.


Scripts


Lorazepam (Ativan) 0.5 Mg Tablet


0.5 MG PO BID PRN for ANXIETY for 7 Days, #10 TAB


   Prov: MEAGAN RICKS         8/5/20


Work/School Note:  Work Release Form   Date Seen in the Emergency Department:  

Aug 5, 2020


   Return to Work:  Aug 7, 2020











MEAGAN RICKS              Aug 5, 2020 21:08

## 2020-08-05 NOTE — XMS REPORT
Continuity of Care Document

                             Created on: 2020



FRANCES BURTON

External Reference #: [id number]

: 1990

Sex: Male



Demographics





                          Address                   [address] [place]  [address]

 

                          Home Phone                [phone] x

 

                          Preferred Language        Unknown

 

                          Marital Status            Unknown

 

                          Quaker Affiliation     Unknown

 

                          Race                      Unknown

 

                          Ethnic Group              Unknown





Author





                          Organization              Unknown

 

                          Address                   Unknown

 

                          Phone                     Unavailable



              



Allergies

      



There is no data.                  



Medications

      



There is no data.                  



Problems

      



             Date Dx Coded           Attending           Type           Code    

       

Diagnosis                               Diagnosed By        

 

             2013           MAURISIO BARR DO                        401.1 

          

HYPERTENSION, BENIGN ESSENTIAL                    

 

             2013           MAURISIO BARR DO                        V70.0 

          

ROUTINE GENERAL MEDICAL EXAMINATION AT A HEALTH CARE FACILITY                   



 

             2013           KEVIN MENJIVAR MD                        401.1

           

HYPERTENSION, BENIGN ESSENTIAL                    

 

             2013           KEVIN MENJIVAR MD                        V70.0

           

ROUTINE GENERAL MEDICAL EXAMINATION AT A HEALTH CARE FACILITY                   



 

             2013           MAURISIO BARR DO                        401.1 

          

HYPERTENSION, BENIGN ESSENTIAL                    

 

             2013           MAURISIO BARR DO                        V70.0 

          

ROUTINE GENERAL MEDICAL EXAMINATION AT A HEALTH CARE FACILITY                   



 

             2013           MAURISIO BARR DO                        272.1 

          

HYPERTRIGLYCERIDEMIA                             

 

             2013           KEVIN MENJIVAR MD                        272.1

           

HYPERTRIGLYCERIDEMIA                             

 

             2013           MAURISIO BARR DO                        272.1 

          

HYPERTRIGLYCERIDEMIA                             

 

             2014           KEVIN MENJIVAR MD                        V81.1

           

HYPERTENSION SCREENING                           

 

             2014           MAURISIO BARR DO                        V81.1 

          

HYPERTENSION SCREENING                           

 

             10/28/2014           MAURISIO BARR DO                        465.9 

          

UPPER RESPIRATORY INFECTION                      

 

             10/28/2014           MAURISIO BARR DO                        787.01

           

NAUSEA WITH VOMITING                             

 

             10/28/2014           MAURISIO BARR DO                        787.91

           

DIARRHEA                                         



                                            



Procedures

      



                Code            Description           Performed By           Per

formed On        

 

                                      15998                                 ROUT

INE VENIPUNCTURE          

                                                    2013        

 

                                00765                                 CMP       

                    

                                        2013        

 

                                      78000                                 LIPI

D PANEL                   

                                                    2013        

 

                                25201                                 TSH       

                    

                                        2013        

 

                                      2000F                                 BLOO

D PRESSURE CHECK          

                                                    2014        

 

                                      43377                                 INFL

UENZA A & B (IN-HOUSE)    

                                                    10/28/2014        



                            



Results

      



                    Test                Result              Range        

 

                                        TSH - 19 16:01         

 

                    TSH                 1.98 mIU/L           0.40-4.50        

 

                                        COVID-19 ([facility]) - 20 13:04       

  



                  



Encounters

      



                ACCT No.           Visit Date/Time           Discharge          

 Status         

             Pt. Type           Provider           Facility           Loc./Unit 

          

Complaint        

 

                [id number]           10/28/2014 17:59:00           10/28/2014 23:59:

59           CLS

                Outpatient           MAURISIO BARR DO                           

          

                                                 

 

                [id number]           2014 18:24:00           2014 23:59:

59           CLS

                Outpatient           KEVIN MENJIVAR MD                          

          

                                                 

 

                [id number]           2013 08:00:00           2013 23:59:

59           CLS

                Outpatient           MAURISIO BARR DO                           

          

                                                 

 

             [id number]           2020 20:50:00                        A

CT           

Emergency                 ARNULFO URENA, RICARDO CASTORENA           [facility]                 ER                        [place],        

 

             [id number]           2020 08:00:00                        ACT     

      

Outpatient           KEVIN GRIMES                               [facility] 

                                                 

 

             [id number]           2020 10:30:00                              

       Document

Registration                                                                    

 

             [id number]           2019 16:40:00                              

       Document

Registration

## 2020-08-05 NOTE — XMS REPORT
[facility]

                             Created on: 2015



Alex Ness

External Reference #: [id number]

: 1990

Sex: Male



Demographics





                          Address                   [address] [place] [address]  [address]

 

                          Home Phone                [phone]

 

                          Preferred Language        Unknown

 

                          Marital Status            Unknown

 

                          Pentecostal Affiliation     Unknown

 

                          Race                      White

 

                          Ethnic Group               or 





Author





                          Author                    Alex BARR

 

                          Organization              [employer]

 

                          Address                   Unknown

 

                          Phone                     Unavailable







Care Team Providers





                    Care Team Member Name Role                Phone

 

                    MAURISIO BARR        CP                  Unavailable



                                                                



Allergies

          No Known Allergies                                                    
                                   



Problems

          



             Problem Type Condition    ICD-9 Code   Onset Dates  Condition Statu

s

 

             Problem      Routine general medical examination at [facility]

ility V70.0                     

Active

 

             Problem      Pure hyperglyceridemia 272.1                     Activ

e

 

             Problem      Essential hypertension, benign 401.1                  

   Active

 

             Problem      Diarrhea     787.91                    Active

 

             Problem      Nausea with vomiting 787.01                    Active

 

             Problem      Screening for hypertension V81.1                     A

ctive

 

             Problem      Acute upper respiratory infections of unspecified site

 465.9                     Active



                                                                                
                                                                   



Medications

          



        Medication Code System Code    Instructions Start Date End Date Status  

Dosage

 

                Lisinopril      NDC             [address]-62   20 MG Orally Onc

e a day- must have [facility] care appt 

for futher refills                                                 2 tablet



                                                                              



Results

          No Known Results                                                      
             



Summary Purpose

          [employer] Submission

## 2020-08-05 NOTE — XMS REPORT
[facility]

                             Created on: 2018



Alex Ness

External Reference #: [id number]

: 1990

Sex: Male



Demographics





                          Address                   [address] [place]  [address]

 

                          Preferred Language        Unknown

 

                          Marital Status            Unknown

 

                          Mandaeism Affiliation     Unknown

 

                          Race                      Unknown

 

                          Ethnic Group              Unknown





Author





                          Author                    Alex BUI

 

                          Organization              [facility]

 

                          Address                   [address] [place]  [address]



 

                          Phone                     [phone]







Care Team Providers





                    Care Team Member Name Role                Phone

 

                    RAMYA  ABDELRAHMAN   Unavailable         [phone]







PROBLEMS





          Type      Condition ICD9-CM Code KDJ28-WW Code Onset Dates Condition S

tatus SNOMED 

Code

 

          Problem   Snoring             R06.83              Active    43530180

 

          Problem   Daytime hypersomnia           G47.19              Active    

[id number]

 

          Problem   Elevated liver enzymes           R74.8               Active 

   [id number]

 

          Problem   Benign essential hypertension           I10                 

Active    2293814

 

          Problem   Type 2 diabetes mellitus without complication           E11.

9               Active    [id number]







ALLERGIES

No Known Allergies



ENCOUNTERS





                Encounter       Location        Date            Diagnosis

 

                          [facility]     [id number]

[address] [place] [address]

                                        Coughing R05 ; Type 2 diabet

es mellitus without complication E11.9 

; Benign essential hypertension I10 ; Right otitis media with effusion H65.91 ; 
Acute upper respiratory infection, unspecified J06.9 and BMI 45.0-49.9, adult 
Z68.42

 

                          [facility] IN [facility]  [address] [id number]

[address] [place] [address] 

[address]                04 2017              Sore throat J02.9 ; Strep ph

aryngitis J02.0 and BMI 

40.0-44.9, adult Z68.41

 

                          [facility]     [id number]

[address] [place] [address]

                          03 Oct, 2017              Type 2 diabetes mellitus wit

hout complication E11.9 and Benign 

essential hypertension I10

 

                          [place] [id number]

[address] [place] [address]

                          15 May, 2017               

 

                          [facility]     [id number]

[address] [place] [address]

                                         

 

                          [facility] IN CARE  [id number]

[address] [place] [address] 

[phone]                              Pharyngitis due to other org

anism J02.8

 

                          [facility]     [address] [facility][note 2] [id number]

[address] [place] [address]

                                         

 

                          [facility]     [address] [facility][note 2] [id number]

[address] [place] [address]

                                         

 

                          [facility]     [address] [facility][note 2] [id number]

[address] [place] [address]

                                         

 

                          [facility]     [address] [facility][note 2] [id number]

[address] [place] [address], [place] [address]

                                         

 

                          [facility]     [address] [facility][note 2] [id number]

[address] [place] [address]

                                        Elevated liver enzymes R74.8

 

                          [facility]     [address] [facility][note 2] [id number]

[address] [place] [address]

                                         

 

                          [facility]     [address] [facility][note 2] [id number]

[address] [place] [address]

                                         

 

                          [facility]     [address] [facility][note 2] [id number]

[address] [place] [address]

                                        Benign essential hypertensio

n I10 ; Type 2 diabetes mellitus 

without complication E11.9 and Elevated liver enzymes R74.8

 

                          [facility]  [address] [facility][note 2] [id number]

[address] [place] [address] 

[address]                               

 

                          [facility]     [address] [facility][note 2] [id number]

[address] [place] [address]

                                         

 

                          [facility]     [address] [facility][note 2] [id number]

[address] [place] [address]

                                        Elevated liver enzymes R74.8

 and Type 2 diabetes mellitus without 

complication E11.9

 

                          [facility]     [address] [facility][note 2] [id number]

[address] [place] [address]

                          29 Mar, 2016              Benign essential hypertensio

n I10 ; Fatigue R53.83 ; Family history

of diabetes mellitus Z83.3 ; Snoring R06.83 and Daytime hypersomnia G47.19

 

                          [facility]  [address] [facility][note 2] [id number]

[address] [place] [address] 

[address]                09 Mar, 2016              Benign essential hypertensio

n I10 ; Vomiting R11.10 ; 

Bronchitis J40 ; Headache R51 and Strep throat J02.0

 

                          [facility]  [address] [id number]

[address] [place] [address] 

[address]                10 Dec, 2015              Acute bronchitis J20.9 and H

TN (hypertension) I10

 

                          [facility]     [address] [id number]

[address] [place] [phone]

                          20 Aug, 2015               

 

                          [facility]     [address] [facility][note 2] [id number]

[address] [place] [address]

                                         

 

                          [facility]     [address] [facility][note 2] [id number]

[address] [place] [address]

                                         

 

                          [facility]     [address] [facility][note 2] [id number]

[address] [place] [address]

                          28 Oct, 2014               

 

                          [facility]     [address] [id number]

[address] [place] [address]

                          28 Oct, 2014               

 

                          [facility]     [address] [facility][note 2] [id number]

[address] [place] [address]

                          19 Sep, 2014               

 

                          [facility]     [address] [facility][note 2] [id number]

[address] [place] [address]

                          19 Sep, 2014               

 

                          [facility]     [address] [facility][note 2] [id number]

[address] [place] [address]

                          27 Aug, 2014               

 

                          [facility]     [address] [id number]

[address] [place] [address]

                          27 Aug, 2014               

 

                          [facility]     [address] [facility][note 2] [id number]

[address] [place] [address]

                          27 Aug, 2014               

 

                          [facility]     [address] [facility][note 2] [id number]

[address] [place] [address]

                          27 Aug, 2014               

 

                          [facility]     [address] [facility][note 2] [id number]

[address] [place] [address]

                                         

 

                          [facility]     [address] [facility][note 2] [id number]

[address] [place] [address]

                                         

 

                          [facility]     [address] [facility][note 2] [id number]

[address] [place] [address]

                                         

 

                          [facility]     [address] [facility][note 2] [id number]

[address] [place] [address]

                                         

 

                          [facility]     [address] [facility][note 2] [id number]

[address] [place] [address]

                                         







IMMUNIZATIONS

No Known Immunizations



SOCIAL HISTORY

Never Assessed



REASON FOR VISIT

sore throat/cough and chest congestion started Th JStrasserRN



PLAN OF CARE





                          Activity                  Details

 

                                         

 

                          Follow Up                 prn Reason:







VITAL SIGNS





                    Height              68 in               2017

 

                    Weight              289 lbs             2017

 

                    Temperature         97.3 degrees Fahrenheit 2017

 

                    Heart Rate          68 bpm              2017

 

                    Respiratory Rate    18                  2017

 

                    BMI                 43.94 kg/m2         2017

 

                    Blood pressure systolic 144 mmHg            2017

 

                    Blood pressure diastolic 100 mmHg            2017







MEDICATIONS





        Medication Instructions Dosage  Frequency Start Date End Date Duration S

tatus

 

                          MetFORMIN HCl  mg   Orally twice a day-No furthe

r refills,must keep 

appointment 2 tablets                         28 days    Active

 

          Amoxicillin 500 MG Orally every 12 hrs 1 capsule 12h       

7  

10 day(s)                               Active

 

                    Lisinopril 40 mg    Orally Once a day-NO further refills,mus

t keep appointment! 1 

tablet                                              28 days      Active

 

                    Glucocard Expression Test Test Strips In Vitro 2 times a day

; and as needed as 

directed                                30 days      Active







RESULTS





                Name            Result          Date            Reference Range

 

                STREP A (IN HOUSE)                 2017       

 

                STREP A         positive                         

 

                Control         +                                

 

                Lot #           [id number]                           

 

                Exp date        2018                        







PROCEDURES





                Procedure       Date Ordered    Result          Body Site

 

                STREP A ASSAY W/OPTIC 2017                     







INSTRUCTIONS





MEDICATIONS ADMINISTERED

No Known Medications



MEDICAL (GENERAL) HISTORY





                    Type                Description         Date

 

                    Medical History     HTN                  

 

                    Medical History     Diabetes Mellitus 3/2016 A1C 9.9  

 

                    Medical History     Elevated liver enzymes

## 2020-08-05 NOTE — XMS REPORT
[facility]

                             Created on: 2019



Alex Ness

External Reference #: [id number]

: 1990

Sex: Male



Demographics





                          Address                   [address] [place]  [address]

 

                          Preferred Language        Unknown

 

                          Marital Status            Unknown

 

                          Protestant Affiliation     Unknown

 

                          Race                      Unknown

 

                          Ethnic Group              Unknown





Author





                          Author                    Alex Mayorga Doctor

 

                          Organization              [facility]

 

                          Address                   Unknown

 

                          Phone                     Unavailable







Care Team Providers





                    Care Team Member Name Role                Phone

 

                    Migration,  Doctor  Unavailable         Unavailable







PROBLEMS





          Type      Condition ICD9-CM Code MYR49-IC Code Onset Dates Condition S

tatus SNOMED 

Code

 

          Problem   Daytime hypersomnia           G47.19              Active    

[id number]

 

          Problem   Snoring             R06.83              Active    80106230

 

          Problem   Elevated liver enzymes           R74.8               Active 

   [id number]

 

          Problem   Type 2 diabetes mellitus without complication           E11.

9               Active    89336889

 

          Problem   Benign essential hypertension           I10                 

Active    7129647







ALLERGIES

No Information



ENCOUNTERS





                Encounter       Location        Date            Diagnosis

 

                          [place] [address] [address]

                                         

 

                          [place] [address] [address]

                                         

 

                          [facility] WALK IN [facility]  [address] 

[address]                07 Mar, 2019              Strep pharyngitis J02.0 ; Si

de pain R10.9 and Morbid 

obesity E66.01

 

                          [place] [address] [address]

                          03 2018              Coughing R05 ; Type 2 diabet

es mellitus without complication E11.9 

; Benign essential hypertension I10 ; Right otitis media with effusion H65.91 ; 
Acute upper respiratory infection, unspecified J06.9 and BMI 45.0-49.9, adult 
Z68.42

 

                          [facility] WALK IN [facility]  [address] 

[address]                04 2017              Sore throat J02.9 ; Strep ph

aryngitis J02.0 and BMI 

40.0-44.9, adult Z68.41

 

                          [place] [address] [address]

                          03 Oct, 2017              Type 2 diabetes mellitus wit

hout complication E11.9 and Benign 

essential hypertension I10

 

                          [facility]     [address] [id number]

[address] [place] [address]

                          15 May, 2017               

 

                          [facility]     [address] [id number]

[address] [place] [address]

                                         

 

                          [facility]  [address] [id number]

[address] [place] [address] 

[address]                              Pharyngitis due to other org

anism J02.8

 

                          [facility]     [address] [id number]

[address] [place] [address]

                                         

 

                          [facility]     [address] [id number]

[address] [place] [address]

                                         

 

                          [facility]     [address] [facility] [id number]

[address] [place] [address]

                                         

 

                          [facility]     [address] [id number]

[address] [place] [address]

                                         

 

                          [facility]     [address] [id number]

[address] [place] [address]

                                        Elevated liver enzymes R74.8

 

                          [facility]     [address] [facility] [id number]

[address] [place] [address]

                                         

 

                          [facility]     [address] [id number]

[address] [place] [address]

                                         

 

                          [facility]     [address] [facility] [id number]

[address] [place] [address]

                                        Benign essential hypertensio

n I10 ; Type 2 diabetes mellitus 

without complication E11.9 and Elevated liver enzymes R74.8

 

                          [facility]  [address] [id number]

[address] [place] [address] 

[address]                               

 

                          [facility]     [address] [facility] [id number]

[address] [place] [address]

                                         

 

                          [facility]     [address] [id number]

[address] [place] [address]

                                        Elevated liver enzymes R74.8

 and Type 2 diabetes mellitus without 

complication E11.9

 

                          [facility]     [address] [address]

                          29 Mar, 2016              Benign essential hypertensio

n I10 ; Fatigue R53.83 ; Family history

of diabetes mellitus Z83.3 ; Snoring R06.83 and Daytime hypersomnia G47.19

 

                          [facility]  [address] 

[address]                09 Mar, 2016              Benign essential hypertensio

n I10 ; Vomiting R11.10 ; 

Bronchitis J40 ; Headache R51 and Strep throat J02.0

 

                          [facility] IN [facility]  [address] 

[address]                10 Dec, 2015              Acute bronchitis J20.9 and H

[place] (hypertension) I10

 

                          [facility]     [address] [address]

                          20 Aug, 2015               

 

                          [facility]     [address] [address]

                                         

 

                          [facility]     [address] [address]

                                         

 

                          [facility]     [address] [address]

                          28 Oct, 2014               

 

                          [facility]     [address] [address]

                          28 Oct, 2014               

 

                          [facility]     [address] [address]

                          19 Sep, 2014               

 

                          [facility]     [address] [address]

                          19 Sep, 2014               

 

                          [facility]     [address] [address]

                          27 Aug, 2014               

 

                          [facility]     [address] [address]

                          27 Aug, 2014               

 

                          [facility]     [address] [id number]

[address] [place] [address]

                          27 Aug, 2014               

 

                          [facility]     [address] [id number]

[address] [place] [address]

                          27 Aug, 2014               

 

                          [facility]     [address] [id number]

[address] [place] [address]

                                         

 

                          [facility]     [address] [id number]

[address] [place] [address]

                                         

 

                          [facility]     [address] [id number]

[address] [place] [address]

                                         

 

                          [facility]     [address] [id number]

[address] [place] [address]

                                         

 

                          [facility]     [address] [id number]

[address] [place] [address]

                                         







IMMUNIZATIONS

No Known Immunizations



SOCIAL HISTORY

Never Assessed



REASON FOR VISIT

[facility]



PLAN OF CARE





VITAL SIGNS





MEDICATIONS

Unknown Medications



RESULTS

No Results



PROCEDURES

No Known procedures



INSTRUCTIONS





MEDICATIONS ADMINISTERED

No Known Medications



MEDICAL (GENERAL) HISTORY





                    Type                Description         Date

 

                    Medical History     HTN                  

 

                    Medical History     Diabetes Mellitus 3/2016 A1C 9.9  

 

                    Medical History     Elevated liver enzymes  

 

                    Surgical History    No know Surgical history

## 2020-08-05 NOTE — XMS REPORT
[facility]

                             Created on: 2019



Alex Ness

External Reference #: [id number]

: 1990

Sex: Male



Demographics





                          Address                   [address] [place]  [address]

 

                          Preferred Language        Unknown

 

                          Marital Status            Unknown

 

                          Religion Affiliation     Unknown

 

                          Race                      Unknown

 

                          Ethnic Group              Unknown





Author





                          Author                    Alex Mayorga Doctor

 

                          Organization              [facility]

 

                          Address                   Unknown

 

                          Phone                     Unavailable







Care Team Providers





                    Care Team Member Name Role                Phone

 

                    Migration,  Doctor  Unavailable         Unavailable







PROBLEMS





          Type      Condition ICD9-CM Code DPW56-RN Code Onset Dates Condition S

tatus SNOMED 

Code

 

          Problem   Snoring             R06.83              Active    54270082

 

          Problem   Hypertriglyceridemia           E78.1               Active   

 [id number]

 

          Problem   Elevated liver enzymes           R74.8               Active 

   [id number]

 

          Problem   Type 2 diabetes mellitus without complication           E11.

9               Active    28520328

 

          Problem   Benign essential hypertension           I10                 

Active    9054516

 

          Problem   Daytime hypersomnia           G47.19              Active    

[id number]







ALLERGIES

No Information



ENCOUNTERS





                Encounter       Location        Date            Diagnosis

 

                          [facility]     [address] [place] [id number]

[address] [place] [address]

                          05 Sep, 2019               

 

                          [facility]     [address] [place] [id number]

[address] [place] [phone]

                          23 Aug, 2019               

 

                          [facility]     [address] [place] [id number]

[address] [place] [address]

                          19 Aug, 2019               

 

                          [facility]     [address] [place] [id number]

[address] [place] [address]

                          13 Aug, 2019              Hypertriglyceridemia E78.1

 

                          [facility]     [address] [place] [id number]

[address] [place] [address]

                          06 Aug, 2019               

 

                          [facility]     [address] [place] [id number]

[address] [place] [address]

                          02 Aug, 2019              Type 2 diabetes mellitus wit

hout complication E11.9 ; Fatigue 

R53.83 ; Morbid obesity E66.01 and Benign essential hypertension I10

 

                          [facility]     [address] [facility] [id number]

[address] [place] [address]

                                         

 

                          [facility]     [address] [place] [id number]

[address] [place] [address]

                                        Type 2 diabetes mellitus wit

hout complication E11.9 and Fatigue 

R53.83

 

                          [facility]     [address] [place] [id number]

[address] [place] [address]

                                         

 

                          [place] [address] [address]

                                         

 

                          [facility] WALK [facility]  [address] [place] [id number] [place] [address] 

[address]                07 Mar, 2019              Strep pharyngitis J02.0 ; Si

de pain R10.9 and Morbid 

obesity E66.01

 

                          [place] [address] [address]

                                        Coughing R05 ; Type 2 diabet

es mellitus without complication E11.9 

; Benign essential hypertension I10 ; Right otitis media with effusion H65.91 ; 
Acute upper respiratory infection, unspecified J06.9 and BMI 45.0-49.9, adult 
Z68.42

 

                          [facility]  [address] 

[address]                04 2017              Sore throat J02.9 ; Strep ph

aryngitis J02.0 and BMI 

40.0-44.9, adult Z68.41

 

                          [place] [address] [address]

                          03 Oct, 2017              Type 2 diabetes mellitus wit

hout complication E11.9 and Benign 

essential hypertension I10

 

                          [place] [address] [address]

                          15 May, 2017               

 

                          [place] [address] [place] [id number] [place] [address]

                                         

 

                          [facility]  [address] 

[address]                              Pharyngitis due to other org

anism J02.8

 

                          [place] [address] [place] [id number]

[address] [place] [address]

                                         

 

                          [place] [address] [address]

                                         

 

                          [facility]     [address] [facility] [id number]

[address] [place] [address]

                                         

 

                          [facility]     [address] [facility] [id number]

[address] [place] [address]

                                         

 

                          [facility]     [address] [facility] [id number]

[address] [place] [address]

                                        Elevated liver enzymes R74.8

 

                          [facility]     [address] [facility] [id number]

[address] [place] [address]

                                         

 

                          [facility]     [address] [facility] [id number]

[address] [place] [address]

                                         

 

                          [facility]     [address] [place] [id number]

[address] [place] [address]

                                        Benign essential hypertensio

n I10 ; Type 2 diabetes mellitus 

without complication E11.9 and Elevated liver enzymes R74.8

 

                          [facility] WALK IN [place] [id number]

[address] [place] [address] 

[address]                               

 

                          [facility]     [address] [place] [id number]

[address] [place] [address]

                                         

 

                          [facility]     [address] [place] [id number]

[address] [place] [address]

                                        Elevated liver enzymes R74.8

 and Type 2 diabetes mellitus without 

complication E11.9

 

                          [place] [address] [place] [id number]

[address] [place] [address]

                          29 Mar, 2016              Benign essential hypertensio

n I10 ; Fatigue R53.83 ; Family history

of diabetes mellitus Z83.3 ; Snoring R06.83 and Daytime hypersomnia G47.19

 

                          [facility]  [address] [place] [id number]

[address] [place] [address] 

[address]                09 Mar, 2016              Benign essential hypertensio

n I10 ; Vomiting R11.10 ; 

Bronchitis J40 ; Headache R51 and Strep throat J02.0

 

                          [facility] WALK IN [facility]  [address] [id number]

[address] [place] [address], [place] 

[address]                10 Dec, 2015              Acute bronchitis J20.9 and H

[place] (hypertension) I10

 

                          [facility]     [address] [id number]

[address] [place] [address], [place] [address]

                          20 Aug, 2015               

 

                          [facility]     [address] [id number]

[address] [place] [address], [place] [address]

                                         

 

                          [facility]     [address] [id number]

[address] [place] [address], [place] [address]

                                         

 

                          [facility]     [address] [id number]

[address] [place] [address], [place] [address]

                          28 Oct, 2014               

 

                          [facility]     [address] [id number]

[address] [place] [address], [place] [address]

                          28 Oct, 2014               

 

                          [facility]     [address] [id number]

[address] [place] [address], [place] [address]

                          19 Sep, 2014               

 

                          [facility]     [address] [id number]

[address] [place] [address], [place] [address]

                          19 Sep, 2014               

 

                          [facility]     [address] [id number]

[address] [place] [address], [place] [address]

                          27 Aug, 2014               

 

                          [facility]     [address] [id number]

[address] [place] [address], [place] [address]

                          27 Aug, 2014               

 

                          [facility]     [address] [id number]

[address] [place] [address], [place] [address]

                          27 Aug, 2014               

 

                          [facility]     [address] [id number]

[address] [place] [address]

                          27 Aug, 2014               

 

                          [facility]     [address] [id number]

[address] [place] [address], [place] [address]

                                         

 

                          [facility]     [address] [id number]

[address] [place] [address], [place] [address]

                                         

 

                          [facility]     [address] [id number]

[address] [place] [address]

                                         

 

                          [facility]     [address] [id number]

[address] [place] [address]

                                         

 

                          [facility]     [address] [facility] [id number]

[address] [address]

                                         







IMMUNIZATIONS

No Known Immunizations



SOCIAL HISTORY

Never Assessed



REASON FOR VISIT





PLAN OF CARE





VITAL SIGNS





                    Height              68 in               2014-10-28

 

                    Weight              283.3 lbs           2014-10-28

 

                    Temperature         102.4 degrees Fahrenheit 2014-10-28

 

                    Heart Rate          120 bpm             2014-10-28

 

                    Respiratory Rate    20                  2014-10-28

 

                    Blood pressure systolic 134 mmHg            2014-10-28

 

                    Blood pressure diastolic 92 mmHg             2014-10-28







MEDICATIONS

Unknown Medications



RESULTS

No Results



PROCEDURES





                Procedure       Date Ordered    Result          Body Site

 

                INFLUENZA ASSAY W/OPTIC Oct 28, 2014                     







INSTRUCTIONS





MEDICATIONS ADMINISTERED

No Known Medications



MEDICAL (GENERAL) HISTORY





                    Type                Description         Date

 

                    Medical History     HTN                  

 

                    Medical History     Diabetes Mellitus 3/2016 A1C 9.9  

 

                    Medical History     Elevated liver enzymes  

 

                    Surgical History    No Surgical history information

## 2020-08-05 NOTE — XMS REPORT
[facility]

                             Created on: 2015



Alex Ness

External Reference #: [id number]

: 1990

Sex: Male



Demographics





                          Address                   [address] [place]  [address]

 

                          Home Phone                [phone]

 

                          Preferred Language        Unknown

 

                          Marital Status            Unknown

 

                          Spiritism Affiliation     Unknown

 

                          Race                      

 

                          Ethnic Group               or 





Author





                          Author                    Alex KIRBY

 

                          Organization              [employer]

 

                          Address                   Unknown

 

                          Phone                     Unavailable







Care Team Providers





                    Care Team Member Name Role                Phone

 

                    KARL KIRBY    CP                  Unavailable



                                                                



Allergies, Adverse Reactions, Alerts

          



                    Substance           Reaction            Event Type

 

                    N.K.D.A.            Info Not Available  Non Drug Allergy



                                                                                
       



Problems

          



             Problem Type Condition    Code         Onset Dates  Condition Statu

s

 

             Assessment   Acute bronchitis J20.9                     Active

 

             Assessment   HTN (hypertension) I10                       Active

 

             Problem      Routine general medical examination at [facility] V70.0                     

Active

 

             Problem      Pure hyperglyceridemia 272.1                     Activ

e

 

             Problem      Essential hypertension, benign 401.1                  

   Active

 

             Problem      Diarrhea     787.91                    Active

 

             Problem      Nausea with vomiting 787.01                    Active

 

             Problem      Screening for hypertension V81.1                     A

ctive

 

             Problem      Acute upper respiratory infections of unspecified site

 465.9                     Active



                                                                                
                                                                                
      



Medications

          



        Medication Code System Code    Instructions Start Date End Date Status  

Dosage

 

        Vicks NyQuil Multi-Symptom NDC     0                                    

   not defined

 

           Tessalon Perles NDC        [address]-94 100 MG Orally Three times a 

day Dec 10, 2015 

Dec 15, 2015                                        1 capsule as needed

 

                Lisinopril      NDC             [address]-86   20 MG Orally Onc

e a day- must have est care appt 

for futher refills                                                 2 tablet

 

                Albuterol Sulfate HFA NDC             [address]-60   108 (90 Ba

se) MCG/ACT Inhalation every 4

hrs             Dec 10, 2015                                    2 puffs as neede

d

 

        Mucinex NDC     [address]-37                                 not define

d



                                                                                
                                               



Procedures

          



                Procedure       Coding System   Code            Date

 

                Office Visit, Est Pt., Level 3 CPT-4           93251           D

 10, 2015



                                                                                
                 



Vital Signs

          



                          Date/Time:                Dec 10, 2015

 

                          Temperature               97.2 F

 

                          Weight                    317.6 lbs

 

                          Height                    68 in

 

                          BMI                       48.29 Index

 

                          Blood Pressure Diastolic  98 mmHg

 

                          Blood Pressure Systolic   146 mmHg

 

                          Cardiac Monitoring Heart Rate 88 bpm



                                                                              



Results

          No Known Results                                                      
             



Summary Purpose

          [employer]Clinical[employer] Submission

## 2020-08-05 NOTE — XMS REPORT
[facility]

                             Created on: 2017



Alex Ness

External Reference #: [id number]

: 1990

Sex: Male



Demographics





                          Address                   [address] [place]  [address]

 

                          Preferred Language        Unknown

 

                          Marital Status            Unknown

 

                          Lutheran Affiliation     Unknown

 

                          Race                      Unknown

 

                          Ethnic Group              Unknown





Author





                          Author                    Alex BOYD

 

                          [facility]

 

                          Address                   [address] [place]  [address]



 

                          Phone                     [phone]







Care Team Providers





                    Care Team Member Name Role                Phone

 

                    ANDRES BOYD       Unavailable         [phone]







PROBLEMS





          Type      Condition ICD9-CM Code JNM57-CZ Code Onset Dates Condition S

tatus SNOMED 

Code

 

          Problem   Snoring             R06.83              Active    39800581

 

          Problem   Family history of diabetes mellitus           Z83.3         

      Active    373822799

 

          Problem   Type 2 diabetes mellitus without complication           E11.

9               Active    15564610

 

          Problem   Elevated liver enzymes           R74.8               Active 

   [id number]

 

          Problem   Daytime hypersomnia           G47.19              Active    

[id number]

 

          Problem   Benign essential hypertension           I10                 

Active    0128712







ALLERGIES

Unknown Allergies



SOCIAL HISTORY

No smoking Hx information available



PLAN OF CARE





VITAL SIGNS





MEDICATIONS

Unknown Medications



RESULTS

No Results



PROCEDURES

No Known procedures



IMMUNIZATIONS

No Known Immunizations

## 2020-08-05 NOTE — XMS REPORT
[facility]

                             Created on: 2020



Alex Ness

External Reference #: [id number]

: 1990

Sex: Male



Demographics





                          Address                   [address] [place]  [address]

 

                          Preferred Language        Unknown

 

                          Marital Status            Unknown

 

                          Bahai Affiliation     Unknown

 

                          Race                      Unknown

 

                          Ethnic Group              Unknown





Author





                          Author                    Alex Mayorag Doctor

 

                          Organization              [facility]

 

                          Address                   Unknown

 

                          Phone                     Unavailable







Care Team Providers





                    Care Team Member Name Role                Phone

 

                    Migration,  Doctor  Unavailable         Unavailable







PROBLEMS





          Type      Condition ICD9-CM Code XGD45-EQ Code Onset Dates Condition S

tatus SNOMED 

Code

 

          Problem   Snoring             R06.83              Active    63091125

 

          Problem   Hypertriglyceridemia           E78.1               Active   

 [id number]

 

          Problem   Elevated liver enzymes           R74.8               Active 

   422366347

 

          Problem   Type 2 diabetes mellitus without complication           E11.

9               Active    13448231

 

          Problem   Benign essential hypertension           I10                 

Active    5016403

 

          Problem   Daytime hypersomnia           G47.19              Active    

[id number]







ALLERGIES

No Information



ENCOUNTERS





                Encounter       Location        Date            Diagnosis

 

                          [place] [address]

[address] [place] [address]

                                         

 

                          [facility]     [id number]

[address] [place] [address]

                                         

 

                          [facility]     [id number]

[address] [place] [address]

                          05 Sep, 2019               

 

                          [facility]     [address]

[address] [place] [address]

                          23 Aug, 2019               

 

                          [facility]     [address]

[address] [place] [address]

                          19 Aug, 2019               

 

                          [facility]     [address] [place] [id number]

[address] [place] [address]

                          13 Aug, 2019              Hypertriglyceridemia E78.1

 

                          [facility]     [id number]

[address] [place] [address]

                          06 Aug, 2019               

 

                          [facility]     [address] [id number]

[address] [place] [address]

                          02 Aug, 2019              Type 2 diabetes mellitus wit

hout complication E11.9 ; Fatigue 

R53.83 ; Morbid obesity E66.01 and Benign essential hypertension I10

 

                          [facility]     [id number]

[address] [place] [address]

                                         

 

                          [place] [address] [address]

                                        Type 2 diabetes mellitus wit

hout complication E11.9 and Fatigue 

R53.83

 

                          [place] [address] [address]

                                         

 

                          [place] [address] [address]

                                         

 

                          [facility] [place] [address] 

[address]                07 Mar, 2019              Strep pharyngitis J02.0 ; Si

de pain R10.9 and Morbid 

obesity E66.01

 

                          [place] [address] [address]

                                        Coughing R05 ; Type 2 diabet

es mellitus without complication E11.9 

; Benign essential hypertension I10 ; Right otitis media with effusion H65.91 ; 
Acute upper respiratory infection, unspecified J06.9 and BMI 45.0-49.9, adult 
Z68.42

 

                          [facility] [place] [address] 

[address]                              Sore throat J02.9 ; Strep ph

aryngitis J02.0 and BMI 

40.0-44.9, adult Z68.41

 

                          [place] [address] [address]

                          03 Oct, 2017              Type 2 diabetes mellitus wit

hout complication E11.9 and Benign 

essential hypertension I10

 

                          [place] [address] [address]

                          15 May, 2017               

 

                          [place] [address] [address]

                                         

 

                          [facility] [place] [facility]  [address] 

[address]                              Pharyngitis due to other org

anism J02.8

 

                          [facility]     [address] [facility] [id number]

[address] [place] [address]

                                         

 

                          [facility]     [address] [facility] [id number]

[address] [place] [address]

                                         

 

                          [facility]     [address] [facility] [id number]

[address] [place] [address]

                                         

 

                          [facility]     [address] [facility] [id number]

[address] [place] [address]

                                         

 

                          [facility]     [address] [facility] [id number]

[address] [place] [address]

                                        Elevated liver enzymes R74.8

 

                          [facility]     [address] [place] [id number]

[address] [place] [address]

                                         

 

                          [facility]     [address] [place] [id number]

[address] [place] [address]

                                         

 

                          [facility]     [address] [facility] [id number]

[address] [place] [address]

                                        Benign essential hypertensio

n I10 ; Type 2 diabetes mellitus 

without complication E11.9 and Elevated liver enzymes R74.8

 

                          [facility]  [address] [place] [id number]

[address] [place] [address] 

[address]                               

 

                          [facility]     [address] [facility] [id number]

[address] [place] [address]

                                         

 

                          [facility]     [address] [facility] [id number]

[address] [place] [address]

                                        Elevated liver enzymes R74.8

 and Type 2 diabetes mellitus without 

complication E11.9

 

                          [facility]     [address] [facility] [id number]

[address] [place] [address]

                          29 Mar, 2016              Benign essential hypertensio

n I10 ; Fatigue R53.83 ; Family history

of diabetes mellitus Z83.3 ; Snoring R06.83 and Daytime hypersomnia G47.19

 

                          [facility]  [address] [id number]

[address] [place] [address] 

[address]                09 Mar, 2016              Benign essential hypertensio

n I10 ; Vomiting R11.10 ; 

Bronchitis J40 ; Headache R51 and Strep throat J02.0

 

                          [facility]  [address] [id number]

[address] [place] [address] 

[address]                10 Dec, 2015              Acute bronchitis J20.9 and H

TN (hypertension) I10

 

                          [facility]     [address] [id number]

[address] [place] [address]

                          20 Aug, 2015               

 

                          [facility]     [address] [id number]

[address] [place] [address]

                                         

 

                          [facility]     [address] [id number]

[address] [place] [address]

                                         

 

                          [facility]     [address] [id number]

[address] [place] [address]

                          28 Oct, 2014               

 

                          [facility]     [address] [id number]

[address] [place] [address]

                          28 Oct, 2014               

 

                          [facility]     [address] [id number]

[address] [place] [address]

                          19 Sep, 2014               

 

                          [facility]     [address] [id number]

[address] [place] [address]

                          19 Sep, 2014               

 

                          [facility]     [address] [id number]

[address] [place] [address]

                          27 Aug, 2014               

 

                          [facility]     [address] [id number]

[address] [place] [address]

                          27 Aug, 2014               

 

                          [facility]     [address] [id number]

[address] [place] [address]

                          27 Aug, 2014               

 

                          [facility]     [address] [id number]

[address] [place] [address]

                          27 Aug, 2014               

 

                          [facility]     [address] [id number]

[address] [place] [address]

                                         

 

                          [facility]     [address] [id number]

[address] [place] [address]

                                         

 

                          [facility]     [address] [facility] [id number]

[address] [place] [address]

                                         

 

                          [facility]     [address] [facility] [id number]

[address] [place] [address]

                                         

 

                          [facility]     [address] [facility] [id number]

[address] [place] [address]

                                         







IMMUNIZATIONS

No Known Immunizations



SOCIAL HISTORY

Never Assessed



REASON FOR VISIT





PLAN OF CARE





VITAL SIGNS





MEDICATIONS

Unknown Medications



RESULTS

No Results



PROCEDURES

No Known procedures



INSTRUCTIONS





MEDICATIONS ADMINISTERED

No Known Medications



MEDICAL (GENERAL) HISTORY





                    Type                Description         Date

 

                    Medical History     HTN                  

 

                    Medical History     Diabetes Mellitus 3/2016 A1C 9.9  

 

                    Medical History     Elevated liver enzymes  

 

                    Surgical History    No Surgical history information

## 2020-08-05 NOTE — XMS REPORT
[facility]

                             Created on: 2020



Alex Ness

External Reference #: [id number]

: 1990

Sex: Male



Demographics





                          Address                   [address] [place] [address]  [address]

 

                          Preferred Language        Unknown

 

                          Marital Status            Unknown

 

                          Yazidism Affiliation     Unknown

 

                          Race                      Unknown

 

                          Ethnic Group              Unknown





Author





                          Author                    Alex Mayorga Doctor

 

                          Organization              [facility]

 

                          Address                   Unknown

 

                          Phone                     Unavailable







Care Team Providers





                    Care Team Member Name Role                Phone

 

                    Migration,  Doctor  Unavailable         Unavailable







PROBLEMS





          Type      Condition ICD9-CM Code NBO30-DA Code Onset Dates Condition S

tatus SNOMED 

Code

 

          Problem   Elevated liver enzymes           R74.8               Active 

   [id number]

 

          Problem   Hypertriglyceridemia           E78.1               Active   

 [id number]

 

          Problem   Obesity, morbid, BMI 40.0-49.9           E66.01             

 Active    [id number]

 

          Problem   Type 2 diabetes mellitus without complication           E11.

9               Active    [id number]

 

          Problem   Benign essential hypertension           I10                 

Active    6962863

 

          Problem   Daytime hypersomnia           G47.19              Active    

[id number]

 

          Problem   Snoring             R06.83              Active    35867735







ALLERGIES

No Information



ENCOUNTERS





                Encounter       Location        Date            Diagnosis

 

                          [facility]     [id number]

[address] [place] [phone]

                                         

 

                          [facility]  [id number]

[address] [place] [address] 

[phone]                              Encounter for laboratory jonn

ting for COVID-19 virus 

Z11.59

 

                          [facility]     [address] [id number]

[address] [place] [phone]

                                         

 

                          [facility]     [address] [id number]

[address] [place] [phone]

                                         

 

                          [facility]     [address] [id number]

[address] [place] [phone]

                          27 May, 2020               

 

                          [facility]     [address] [id number]

[address] [place] [phone]

                          07 May, 2020               

 

                          [facility]     [address] [id number]

[address] [place] [phone]

                                        Type 2 diabetes mellitus wit

hout complication E11.9 ; Benign 

essential hypertension I10 ; Hypertriglyceridemia E78.1 and Obesity, morbid, BMI
40.0-49.9 E66.01

 

                          [facility]     [address] [id number]

[address] [place] [phone]

                                        Type 2 diabetes mellitus wit

hout complication E11.9 and Benign 

essential hypertension I10

 

                          [facility]     [address] [id number]

[address] [place] [phone]

                                         

 

                          [facility]     [address] [id number]

[address] [place] [phone]

                                         

 

                          [facility]     [address] [id number]

[address] [place] [phone]

                          05 Sep, 2019               

 

                          [facility]     [address] [id number]

[address] [place] [address]

                          23 Aug, 2019               

 

                          [facility]     [address] [id number]

[address] [place] [phone]

                          19 Aug, 2019               

 

                          [facility]     [address] [facility] [id number]

[address] [place] [address]

                          13 Aug, 2019              Hypertriglyceridemia E78.1

 

                          [facility]     [address] [id number]

[address] [place] [phone]

                          06 Aug, 2019               

 

                          [facility]     [address] [facility] [id number]

[address] [place] [phone]

                          02 Aug, 2019              Type 2 diabetes mellitus wit

hout complication E11.9 ; Fatigue 

R53.83 ; Morbid obesity E66.01 and Benign essential hypertension I10

 

                          [facility]     [address] [id number]

[address] [place] [phone]

                                         

 

                          [facility]     [address] [id number]

[address] [place] [phone]

                                        Type 2 diabetes mellitus wit

hout complication E11.9 and Fatigue 

R53.83

 

                          [facility]     [address] [id number]

[address] [place] [phone]

                                         

 

                          [facility]     [address] [facility] [id number]

[address] [place] [phone]

                                         

 

                          [facility]  [address] [id number]

[address] [place] [address] 

[id number]                07 Mar, 2019              Strep pharyngitis J02.0 ; Si

de pain R10.9 and Morbid 

obesity E66.01

 

                          [facility]     [address] [address]

                          03 2018              Coughing R05 ; Type 2 diabet

es mellitus without complication E11.9 

; Benign essential hypertension I10 ; Right otitis media with effusion H65.91 ; 
Acute upper respiratory infection, unspecified J06.9 and BMI 45.0-49.9, adult 
Z68.42

 

                          [facility]  [address] 

[phone]                04 2017              Sore throat J02.9 ; Strep ph

aryngitis J02.0 and BMI 

40.0-44.9, adult Z68.41

 

                          [place] [address] [address]

                          03 Oct, 2017              Type 2 diabetes mellitus wit

hout complication E11.9 and Benign 

essential hypertension I10

 

                          [place] [address] [address]

                          15 May, 2017               

 

                          [place] [address] [address]

                                         

 

                          [facility] [place] [facility]  [address] 

[address]                              Pharyngitis due to other org

anism J02.8

 

                          [place] [address] [id number]

[address] [place] [phone]

                                         

 

                          [place] [address] [address]

                                         

 

                          [place] [address] [address]

                                         

 

                          [place] [address] [address]

                                         

 

                          [place] [address] [address]

                                        Elevated liver enzymes R74.8

 

                          [facility]     [address] [address]

                                         

 

                          [facility]     [address] [address]

                                         

 

                          [facility]     [address] [address]

                                        Benign essential hypertensio

n I10 ; Type 2 diabetes mellitus 

without complication E11.9 and Elevated liver enzymes R74.8

 

                          [facility]  [address] 

[address]                               

 

                          [place] [address] [address]

                                         

 

                          [facility]     [address] [address]

                                        Elevated liver enzymes R74.8

 and Type 2 diabetes mellitus without 

complication E11.9

 

                          [place] [address] [address]

                          29 Mar, 2016              Benign essential hypertensio

n I10 ; Fatigue R53.83 ; Family history

of diabetes mellitus Z83.3 ; Snoring R06.83 and Daytime hypersomnia G47.19

 

                          [facility]  [address] 

[address]                09 Mar, 2016              Benign essential hypertensio

n I10 ; Vomiting R11.10 ; 

Bronchitis J40 ; Headache R51 and Strep throat J02.0

 

                          [facility]  [address] 

[phone]                10 Dec, 2015              Acute bronchitis J20.9 and H

TN (hypertension) I10

 

                          [facility]     [address] [address]

                          20 Aug, 2015               

 

                          [facility]     [address] [address]

                                         

 

                          [facility]     [address] [id number]

[address] [place] [phone]

                                         

 

                          [facility]     [address] [id number]

[address] [place] [phone]

                          28 Oct, 2014               

 

                          [facility]     [address] [id number]

[address] [place] [phone]

                          28 Oct, 2014               

 

                          [facility]     [address] [id number]

[address] [place] [phone]

                          19 Sep, 2014               

 

                          [facility]     [address] [id number]

[address] [place] [address]

                          19 Sep, 2014               

 

                          [facility]     [address] [id number]

[address] [place] [phone]

                          27 Aug, 2014               

 

                          [facility]     [address] [id number]

[address] [place] [phone]

                          27 Aug, 2014               

 

                          [facility]     [address] [id number]

[address] [place] [phone]

                          27 Aug, 2014               

 

                          [facility]     [address] [id number]

[address] [place] [phone]

                          27 Aug, 2014               

 

                          [facility]     [address] [id number]

[address] [place] [phone]

                                         

 

                          [facility]     [address] [id number]

[address] [place] [phone]

                                         

 

                          [facility]     [address] [id number]

[address] [place] [phone]

                                         

 

                          [facility]     [address] [id number]

[address] [place] [phone]

                                         

 

                          [facility]     [address] [id number]

[address] [place] [phone]

                                         







IMMUNIZATIONS

No Known Immunizations



SOCIAL HISTORY

Never Assessed



REASON FOR VISIT





PLAN OF CARE





VITAL SIGNS





MEDICATIONS

Unknown Medications



RESULTS

No Results



PROCEDURES

No Known procedures



INSTRUCTIONS





MEDICATIONS ADMINISTERED

No Known Medications



MEDICAL (GENERAL) HISTORY





                    Type                Description         Date

 

                    Medical History     HTN                  

 

                    Medical History     Diabetes Mellitus 3/2016 A1C 9.9  

 

                    Medical History     Elevated liver enzymes  

 

                    Surgical History    No Surgical history information

## 2020-08-05 NOTE — XMS REPORT
[facility]

                             Created on: 2018



Alex Ness

External Reference #: [id number]

: 1990

Sex: Male



Demographics





                          Address                   [address] [place]  [address]

 

                          Preferred Language        Unknown

 

                          Marital Status            Unknown

 

                          Confucianism Affiliation     Unknown

 

                          Race                      Unknown

 

                          Ethnic Group              Unknown





Author





                          Author                    Alex BOYD

 

                          Organization              [facility]

 

                          Address                   [address] [place]  [address]



 

                          Phone                     [phone]







Care Team Providers





                    Care Team Member Name Role                Phone

 

                    DEB  ANDRES       Unavailable         [phone]







PROBLEMS





          Type      Condition ICD9-CM Code TEZ28-OD Code Onset Dates Condition S

tatus SNOMED 

Code

 

          Problem   Snoring             R06.83              Active    61591836

 

          Problem   Daytime hypersomnia           G47.19              Active    

[id number]

 

          Problem   Elevated liver enzymes           R74.8               Active 

   [id number]

 

          Problem   Benign essential hypertension           I10                 

Active    6792747

 

          Problem   Type 2 diabetes mellitus without complication           E11.

9               Active    [id number]







ALLERGIES

No Information



ENCOUNTERS





                Encounter       Location        Date            Diagnosis

 

                          [facility]     [address] [id number]

[address] [place] [phone]

                                        Coughing R05 ; Type 2 diabet

es mellitus without complication E11.9 

; Benign essential hypertension I10 ; Right otitis media with effusion H65.91 ; 
Acute upper respiratory infection, unspecified J06.9 and BMI 45.0-49.9, adult 
Z68.42

 

                          [facility]  [address] [id number]

[address] [place] [address] 

[phone]                04 2017              Sore throat J02.9 ; Strep ph

aryngitis J02.0 and BMI 

40.0-44.9, adult Z68.41

 

                          [facility]     [address] [place], [place] [id number]

[address] [place] [phone]

                          03 Oct, 2017              Type 2 diabetes mellitus wit

hout complication E11.9 and Benign 

essential hypertension I10

 

                          [facility]     [address] [place], [place] [id number]

[address] [place] [phone]

                          15 May, 2017               

 

                          [facility]     [address] [id number]

[address] [place] [phone]

                                         

 

                          [facility]  [address] [facility] [id number]

[address] [place] [address] 

[phone]                              Pharyngitis due to other org

anism J02.8

 

                          [facility]     [address] [facility] [id number]

[address] [place] [phone]

                                         

 

                          [facility]     [address] [facility] [id number]

[address] [place] [phone]

                                         

 

                          [facility]     [address] [facility] [id number]

[address] [place] [phone]

                                         

 

                          [facility]     [address] [facility] [id number]

[address] [place] [phone]

                                         

 

                          [facility]     [address] [facility] [id number]

[address] [place] [phone]

                                        Elevated liver enzymes R74.8

 

                          [facility]     [address] [facility] [id number]

[address] [place] [phone]

                                         

 

                          [facility]     [address] [facility] [id number]

[address] [place] [phone]

                                         

 

                          [facility]     [address] [facility] [id number]

[address] [place] [phone]

                                        Benign essential hypertensio

n I10 ; Type 2 diabetes mellitus 

without complication E11.9 and Elevated liver enzymes R74.8

 

                          [facility]  [address] [facility] [id number]

[address] [place] [address] 

[phone]                               

 

                          [facility]     [address] [facility] [id number]

[address] [place] [phone]

                                         

 

                          [facility]     [address] [facility] [id number]

[address] [place] [phone]

                                        Elevated liver enzymes R74.8

 and Type 2 diabetes mellitus without 

complication E11.9

 

                          [facility]     [address] [facility] [id number]

[address] [place] [phone]

                          29 Mar, 2016              Benign essential hypertensio

n I10 ; Fatigue R53.83 ; Family history

of diabetes mellitus Z83.3 ; Snoring R06.83 and Daytime hypersomnia G47.19

 

                          [facility]  [address] [id number]

[address] [place] [address] 

[phone]                09 Mar, 2016              Benign essential hypertensio

n I10 ; Vomiting R11.10 ; 

Bronchitis J40 ; Headache R51 and Strep throat J02.0

 

                          [facility]  [address] [id number]

[address] [place] [address] 

[phone]                10 Dec, 2015              Acute bronchitis J20.9 and H

TN (hypertension) I10

 

                          [facility]     [address] [id number]

[address] [place] [phone]

                          20 Aug, 2015               

 

                          [facility]     [address] [id number]

[address] [place] [phone]

                                         

 

                          [facility]     [address] [facility] [id number]

[address] [place] [phone]

                                         

 

                          [facility]     [address] [facility] [id number]

[address] [place] [phone]

                          28 Oct, 2014               

 

                          [facility]     [address] [id number]

[address] [place] [phone]

                          28 Oct, 2014               

 

                          [facility]     [address] [facility] [id number]

[address] [place] [phone]

                          19 Sep, 2014               

 

                          [facility]     [address] [facility] [id number]

[address] [place] [phone]

                          19 Sep, 2014               

 

                          [facility]     [address] [facility] [id number]

[address] [place] [phone]

                          27 Aug, 2014               

 

                          [facility]     [address] [id number]

[address] [place] [phone]

                          27 Aug, 2014               

 

                          [facility]     [address] [facility] [id number]

[address] [place] [phone]

                          27 Aug, 2014               

 

                          [facility]     [address] [facility] [id number]

[address] [place] [phone]

                          27 Aug, 2014               

 

                          [facility]     [address] [facility] [id number]

[address] [place] [phone]

                                         

 

                          [facility]     [address] [facility] [id number]

[address] [place] [phone]

                                         

 

                          [facility]     [address] [facility] [id number]

[address] [place] [phone]

                                         

 

                          [facility]     [address] [facility] [id number]

[address] [place] [phone]

                                         

 

                          [facility]     [address] [facility] [id number]

[address] [place] [phone]

                                         







IMMUNIZATIONS

No Known Immunizations



SOCIAL HISTORY

Never Assessed



REASON FOR VISIT

Refill request



PLAN OF CARE





VITAL SIGNS





MEDICATIONS





        Medication Instructions Dosage  Frequency Start Date End Date Duration S

tatus

 

                          MetFORMIN HCl  mg   Orally twice a day-No furthe

r refills,must keep 

appointment 2 tablets                         28 days    Active

 

                    Lisinopril 40 mg    Orally Once a day-NO further refills,mus

t keep appointment! 1 

tablet                                              28 days      Active







RESULTS

No Results



PROCEDURES

No Known procedures



INSTRUCTIONS





MEDICATIONS ADMINISTERED

No Known Medications



MEDICAL (GENERAL) HISTORY





                    Type                Description         Date

 

                    Medical History     HTN                  

 

                    Medical History     Diabetes Mellitus 3/2016 A1C 9.9  

 

                    Medical History     Elevated liver enzymes

## 2020-08-09 ENCOUNTER — HOSPITAL ENCOUNTER (EMERGENCY)
Dept: HOSPITAL 75 - ER | Age: 30
Discharge: HOME | End: 2020-08-09
Payer: SELF-PAY

## 2020-08-09 VITALS — BODY MASS INDEX: 41.1 KG/M2 | HEIGHT: 68.11 IN | WEIGHT: 271.17 LBS

## 2020-08-09 VITALS — SYSTOLIC BLOOD PRESSURE: 153 MMHG | DIASTOLIC BLOOD PRESSURE: 100 MMHG

## 2020-08-09 DIAGNOSIS — E11.9: ICD-10-CM

## 2020-08-09 DIAGNOSIS — T43.595A: ICD-10-CM

## 2020-08-09 DIAGNOSIS — R20.2: Primary | ICD-10-CM

## 2020-08-09 DIAGNOSIS — I10: ICD-10-CM

## 2020-08-09 DIAGNOSIS — F41.9: ICD-10-CM

## 2020-08-09 PROCEDURE — 99283 EMERGENCY DEPT VISIT LOW MDM: CPT

## 2020-08-09 NOTE — ED PSYCHOSOCIAL
General


Chief Complaint:  Psych/Social Disorder


Stated Complaint:  R SIDED FACE NUMBNESS


Nursing Triage Note:  


intermittant right sided facial numbness. pt reports increased anxiety since 


begining of covid pandemic


Source:  patient


Exam Limitations:  no limitations


 (DOUG CHIU STUDENT)





History of Present Illness


Date Seen by Provider:  Aug 9, 2020


Time Seen by Provider:  22:00


Initial Comments


Alex Morales is a 30 year old male seen today due to right sided facial tinging 

and numbness sensation.  He reports that he was seen in the ED on August 5th and

was treated for a panic attack, given lorazepam to take if his symptoms 

recurred.  He also reports seeing his PCP at the [facility] in [place], and was 

started on Buspirone.  He reports that since he began buspirone he has had 

tingling on the R side of his face, as well as intermittent burning sensation to

his b/l arms.  He has not taken anything for this sensation, he reports the 

lorazepam made him feel down.  He does report that cold damp cloths help the 

sensation in his arms.  He is concerned about having a stroke.  Denies any 

weakness, slurring of speech or facial asymmetries, fever, chills, sore throat 

or congestion, SOB, CP, palpitations.


Timing/Duration:  yesterday


Severity:  mild


Associated Symptoms:  anxiety


 (DOUG CHIU STUDENT)


Initial Comments


Mom and dad both have high blood pressure and diabetes as well as the patient. 

He says his blood sugars lately have been running under 150. He's not routinely 

checking his temperature however. He is taking the medication as prescribed. His

symptoms began the same time he started buspirone.


 (LYRIC VICENTE)





Allergies and Home Medications


Allergies


Coded Allergies:  


     No Known Drug Allergies (Unverified , 8/5/20)





Home Medications


Lisinopril 5 Mg Tablet, Unknown Dose PO DAILY, (Reported)


Lorazepam 0.5 Mg Tablet, 0.5 MG PO BID PRN for ANXIETY


   Prescribed by: MEAGAN RICKS on 8/5/20 [id number]





Patient Home Medication List


Home Medication List Reviewed:  Yes


 (LYRIC VICENTE)





Review of Systems


Constitutional:  No chills, No fever, No weakness


EENTM:  No vision loss, No nose congestion, No throat pain


Respiratory:  No cough, No short of breath


Cardiovascular:  No chest pain, No palpitations


Gastrointestinal:  No nausea, No vomiting


Psychiatric/Neurological:  Anxiety; Denies Headache; Numbness, Paresthesia, 

Tingling; Denies Weakness (DOUG CHIU)





All Other Systems Reviewed


Negative Unless Noted:  Yes


 (LYRIC VICENTE)





Past Medical-Social-Family Hx


Patient Social History


Alcohol Use:  Denies Use


Recreational Drug Use:  No


Smoking Status:  Never a Smoker


2nd Hand Smoke Exposure:  No


Recent Foreign Travel:  No


Contact w/Someone Who Travel:  No


Recent Infectious Disease Expo:  No


Recent Hopitalizations:  No


Physical Abuse:  No


Sexual Abuse:  No


Mistreated:  No


Fear:  No


 (DOUG CHIU)


Alcohol Use:  Denies Use


Recreational Drug Use:  No


Smoking Status:  Never a Smoker


 (LYRIC VICENTE)


Immunizations Up To Date


Tetanus Booster (TDap):  Unknown


 (DOUG CHIU)





Seasonal Allergies


Seasonal Allergies:  No


 (DOUG CHIU)





Past Medical History


Surgeries:  No


Respiratory:  No


Cardiac:  Yes


High Cholesterol, Hypertension


Neurological:  No


Genitourinary:  No


Gastrointestinal:  No


Musculoskeletal:  No


Endocrine:  Yes


Diabetes, Insulin dep


HEENT:  No


Cancer:  No


Psychosocial:  Yes


Anxiety


Integumentary:  No


Blood Disorders:  No


 (DOUG CHIU)





Physical Exam





Vital Signs - First Documented








 8/9/20





 21:43


 


Temp 36.8


 


Pulse 91


 


Resp 16


 


B/P (MAP) 153/100 (117)


 


Pulse Ox 97


 


O2 Delivery Room Air





 (LYRIC VICENTE)


Capillary Refill : Less Than 3 Seconds 


 (DOUG CHIU)


Height, Weight, BMI


Height: '"


Weight: lbs. oz. kg; 41.00 BMI


Method:


General Appearance:  no apparent distress, obese


HEENT:  PERRL/EOMI; No scleral icterus (R), No scleral icterus (L)


Neck:  non-tender, full range of motion, supple, normal inspection


Respiratory:  lungs clear, normal breath sounds, no respiratory distress, no 

accessory muscle use


Cardiovascular:  normal peripheral pulses, regular rate, rhythm, no edema, no 

JVD, no murmur


Extremities:  non-tender, normal inspection, no pedal edema, no calf tenderness


Neurologic/Psychiatric:  no motor/sensory deficits, alert, normal mood/affect, 

oriented x 3


Appearance/Memory:  appropriate appearance, appropriate insight, neat


Behavior/Eye Contact:  cooperative, good eye contact, normal speech


Skin:  normal color, warm/dry (DOUG CHIU MED STUDENT)


Peripheral Pulses:  2+ Radial Pulses (R), 2+ Radial Pulses (L)


Gastrointestinal:  normal bowel sounds, non tender (LYRIC VICENTE)





Progress/Results/Core Measures


Results/Orders


Vital Signs/I&O











 8/9/20





 21:43


 


Temp 36.8


 


Pulse 91


 


Resp 16


 


B/P (MAP) 153/100 (117)


 


Pulse Ox 97


 


O2 Delivery Room Air





 (LYRIC VICENTE)








Blood Pressure Mean:                    117











Progress


Progress Note :  


   Time:  22:31


Progress Note


Patient seems to be having some generalized upper extremity paresthesias and 

right facial numbness. He does not appear to have any neurologic deficits 

otherwise. Differential includes Bell's palsy or paresthesias related to his 

recent starting buspirone. Ear nose and throat exam unremarkable. We recommend 

he discontinue the buspirone since he's only been on it for a couple days and 

follow-up next week with his primary care provider. We have answered all of his 

questions and he is agreement with this plan. We have given him good return 

precautions should he experience any worsening neurologic deficits besides 

paresthesias.


I attest that I saw this patient alongside the medical student and agree with 

his documented history, physical exam and review of systems except as otherwise 

noted.


 (LYRIC VICENTE)





Departure


Impression





   Primary Impression:  


   Paresthesia of upper extremity


   Additional Impressions:  


   Facial paresthesia


   Non-dose-related adverse reaction to medication


   Qualified Codes:  T88.7XXA - Unspecified adverse effect of drug or 

   medicament, initial encounter


Disposition:  01 HOME, SELF-CARE


Condition:  Stable





Departure-Patient Inst.


Decision time for Depature:  22:34


 (LYRIC VICENTE)


Referrals:  


[facility] (PCP)


Primary Care Physician








KEVIN GRIMES (Family)


Primary Care Physician


Patient Instructions:  Side Effects From Medicines





Add. Discharge Instructions:  


I would suggest that the tingling/numbness is related to the buspirone which you

recently started. You may stop taking it now and discuss it in the next week or 

2 with your primary care doctor or if you would prefer you can talk your doctor 

first before you stop taking it. Sometimes the symptoms will minimize over time 

if you keep taking the medication.


Return to the ER if you have drooping of the face, weakness or other worrisome 

symptoms such as shortness of breath, chest pain etc.





All discharge instructions reviewed with patient and/or family. Voiced 

understanding.





NIH Stroke Scale


NIH Stroke Scale


NIH :  


   Select:  Initial


   Level of Consciousness:  0=Alert


   Level of Consciousness-Questio:  0=Answers both month/age


   LOC Commands:  0=Performs both tasks


   Gaze:  0=Normal


   Visual Fields:  0=No visual loss


   Facial Movement (Facial Paresi:  0=Normal symmetrical mnt


   Motor Function-Arms Right:  0=No drift


   Motor Function-Arms Left:  0=No drift


   Motor Function-Legs Right:  0=No drift


   Motor Function-Legs Left:  0=No drift


   Limb Ataxia:  0=Absent


   Sensory:  0=Normal:no loss


   Best Language:  0=No aphasia


   Dysarthria:  0=Normal


   Extinction & Inattention:  0=No abnormality


   NIH Stroke Scale Score:  0


 (LYRIC VICENTE)











DOUG CHIU MED STUDENT           Aug 9, 2020 22:22


LYRIC VICENTE                  Aug 9, 2020 22:36

## 2020-08-09 NOTE — XMS REPORT
Continuity of Care Document

                             Created on: 2020



FRANCES BURTON

External Reference #: [id number]

: 1990

Sex: Male



Demographics





                          Address                   [address] [place]  [address]

 

                          Home Phone                [phone] x

 

                          Preferred Language        Unknown

 

                          Marital Status            Unknown

 

                          Confucianist Affiliation     Unknown

 

                          Race                      Unknown

 

                          Ethnic Group              Unknown





Author





                          Organization              Unknown

 

                          Address                   Unknown

 

                          Phone                     Unavailable



              



Allergies

      



             Active           Description           Code           Type         

  Severity   

                Reaction           Onset           Reported/Identified          

 

Relationship to Patient                 Clinical Status        

 

                Yes             No Known Drug Allergies           [id number]    

       Drug 

Allergy           Unknown           N/A                             2020  

      

                                                             



                  



Medications

      



There is no data.                  



Problems

      



             Date Dx Coded           Attending           Type           Code    

       

Diagnosis                               Diagnosed By        

 

             2013           MAURISIO BARR DO                        401.1 

          

HYPERTENSION, BENIGN ESSENTIAL                    

 

             2013           MAURISIO BARR DO                        V70.0 

          

ROUTINE GENERAL MEDICAL EXAMINATION AT A HEALTH CARE FACILITY                   



 

             2013           KEVIN MENJIVAR MD                        401.1

           

HYPERTENSION, BENIGN ESSENTIAL                    

 

             2013           KEVIN MENJIVAR MD                        V70.0

           

ROUTINE GENERAL MEDICAL EXAMINATION AT A HEALTH CARE FACILITY                   



 

             2013           MAURISIO BARR DO                        401.1 

          

HYPERTENSION, BENIGN ESSENTIAL                    

 

             2013           MAURISIO BARR DO                        V70.0 

          

ROUTINE GENERAL MEDICAL EXAMINATION AT A HEALTH CARE FACILITY                   



 

             2013           MAURISIO BARR DO                        272.1 

          

HYPERTRIGLYCERIDEMIA                             

 

             2013           KEVIN MENJIVAR MD                        272.1

           

HYPERTRIGLYCERIDEMIA                             

 

             2013           MAURISIO BARR DO                        272.1 

          

HYPERTRIGLYCERIDEMIA                             

 

             2014           KEVIN MENJIVAR MD                        V81.1

           

HYPERTENSION SCREENING                           

 

             2014           MAURISIO BARR DO                        V81.1 

          

HYPERTENSION SCREENING                           

 

             10/28/2014           MAURISIO BARR DO                        465.9 

          

UPPER RESPIRATORY INFECTION                      

 

             10/28/2014           MAURISIO BARR DO                        787.01

           

NAUSEA WITH VOMITING                             

 

             10/28/2014           MAURISIO BARR DO                        787.91

           

DIARRHEA                                         

 

             2020           MEAGAN RICKS           Ot           E66

.9           

OBESITY, UNSPECIFIED                             

 

             2020           MEAGAN RICKS           Ot           F41

.9           

ANXIETY DISORDER, UNSPECIFIED                    

 

             2020           MEAGAN RICKS           Ot           E66

.9           

OBESITY, UNSPECIFIED                             

 

             2020           MEAGAN RICKS           Ot           F41

.9           

ANXIETY DISORDER, UNSPECIFIED                    



                                                    



Procedures

      



                Code            Description           Performed By           Per

formed On        

 

                                      01627                                 ROUT

INE VENIPUNCTURE          

                                                    2013        

 

                                72026                                 CMP       

                    

                                        2013        

 

                                      06718                                 LIPI

D PANEL                   

                                                    2013        

 

                                10208                                 TSH       

                    

                                        2013        

 

                                      2000F                                 BLOO

D PRESSURE CHECK          

                                                    2014        

 

                                      91931                                 INFL

[facility] (IN-HOUSE)    

                                                    10/28/2014        



                            



Results

      



                    Test                Result              Range        

 

                                        TSH - 19 16:01         

 

                    TSH                 1.98 mIU/L           0.40-4.50        

 

                                        COVID-19 ([facility]) - 20 13:04       

  

 

                                        CMP - 20 08:09         

 

                    GLUCOSE             111 mg/dL           65-99        

 

                    UREA NITROGEN (BUN)           15 mg/dL            7-25      

  

 

                    CREATININE           0.73 mg/dL           0.60-1.35        

 

                    eGFR NON-AFR. AMERICAN           123 mL/min/1.73m2          

 > OR = 60        

 

                    eGFR            142 mL/min/1.73m2           

> OR = 60        

 

                    BUN/CREATININE RATIO           NOT APPLICABLE (calc)        

   6-22        

 

                    SODIUM              139 mmol/L           135-146        

 

                    POTASSIUM           3.8 mmol/L           3.5-5.3        

 

                    CHLORIDE            104 mmol/L                   

 

                    CARBON DIOXIDE           26 mmol/L           20-32        

 

                    CALCIUM             9.1 mg/dL           8.6-10.3        

 

                    PROTEIN, TOTAL           7.4 g/dL            6.1-8.1        

 

                    ALBUMIN             4.5 g/dL            3.6-5.1        

 

                    GLOBULIN            2.9 g/dL (calc)           1.9-3.7       

 

 

                    ALBUMIN/GLOBULIN RATIO           1.6 (calc)           1.0-2.

5        

 

                    BILIRUBIN, TOTAL           0.6 mg/dL           0.2-1.2      

  

 

                    ALKALINE PHOSPHATASE           64 U/L                 

     

 

                    AST                 22 U/L              10-40        

 

                    ALT                 47 U/L              9-46        

 

                                        CBC - 20 08:09         

 

                    WHITE BLOOD CELL COUNT           6.6 Thousand/uL           3

.8-10.8        

 

                    RED BLOOD CELL COUNT           5.53 Million/uL           4.2

0-5.80        

 

                    HEMOGLOBIN           15.9 g/dL           13.2-17.1        

 

                    HEMATOCRIT           48.5 %              38.5-50.0        

 

                    MCV                 87.7 fL             80.0-100.0        

 

                    MCH                 28.8 pg             27.0-33.0        

 

                    MCHC                32.8 g/dL           32.0-36.0        

 

                    RDW                 13.2 %              11.0-15.0        

 

                    PLATELET COUNT           205 Thousand/uL           140-400  

      

 

                    MPV                 11.6 fL             7.5-12.5        

 

                    ABSOLUTE NEUTROPHILS           4679 cells/uL           1500-

7800        

 

                    ABSOLUTE LYMPHOCYTES           1214 cells/uL           850-3

900        

 

                    ABSOLUTE MONOCYTES           554 cells/uL           200-950 

       

 

                    ABSOLUTE EOSINOPHILS           119 cells/uL           

        

 

                    ABSOLUTE BASOPHILS           33 cells/uL           0-200    

    

 

                    NEUTROPHILS           70.9 %              NRG        

 

                    LYMPHOCYTES           18.4 %              NRG        

 

                    MONOCYTES           8.4 %               NRG        

 

                    EOSINOPHILS           1.8 %               NRG        

 

                    BASOPHILS           0.5 %               NRG        

 

                                        Complete blood count (CBC) with automate

d white blood cell (WBC) differential - 

20 21:15         

 

                          Blood leukocytes automated count (number/volume)      

     9.0 10*3/uL          

                                        4.3-11.0        

 

                          Blood erythrocytes automated count (number/volume)    

       5.26 10*6/uL       

                                        4.35-5.85        

 

                    Venous blood hemoglobin measurement (mass/volume)           

15.0 g/dL           

13.3-17.7        

 

                    Blood hematocrit (volume fraction)           45 %           

     40-54        

 

                    Automated erythrocyte mean corpuscular volume           85 [

foz_us]           

80-99        

 

                                        Automated erythrocyte mean corpuscular h

emoglobin (mass per erythrocyte)        

                          29 pg                     25-34        

 

                                        Automated erythrocyte mean corpuscular h

emoglobin concentration measurement 

(mass/volume)             34 g/dL                   32-36        

 

                    Automated erythrocyte distribution width ratio           12.

9 %              10.0-

14.5        

 

                    Automated blood platelet count (count/volume)           193 

10*3/uL           

130-400        

 

                          Automated blood platelet mean volume measurement      

     11.0 [foz_us]        

                                        7.4-10.4        

 

                    Automated blood neutrophils/100 leukocytes           73 %   

             42-75       

 

 

                    Automated blood lymphocytes/100 leukocytes           18 %   

             12-44       

 

 

                    Blood monocytes/100 leukocytes           8 %                

 0-12        

 

                    Automated blood eosinophils/100 leukocytes           1 %    

             0-10        

 

                    Automated blood basophils/100 leukocytes           0 %      

           0-10        

 

                    Blood neutrophils automated count (number/volume)           

6.5 10*3            

1.8-7.8        

 

                    Blood lymphocytes automated count (number/volume)           

1.6 10*3            

1.0-4.0        

 

                    Blood monocytes automated count (number/volume)           0.

7 10*3            

0.0-1.0        

 

                    Automated eosinophil count           0.1 10*3/uL           0

.0-0.3        

 

                    Automated blood basophil count (count/volume)           0.0 

10*3/uL           

0.0-0.1        

 

                                        Fibrin D-dimer FEU measurement in platel

et poor plasma (mass/volume) - 20 

21:15         

 

                          Fibrin D-dimer FEU measurement in platelet poor plasma

 (mass/volume)           <

 ug/mL                                  0.00-0.49        

 

                                        Comprehensive metabolic panel - 20

 21:15         

 

                          Serum or plasma sodium measurement (moles/volume)     

      138 mmol/L          

                                        135-145        

 

                          Serum or plasma potassium measurement (moles/volume)  

         3.6 mmol/L       

                                        3.6-5.0        

 

                          Serum or plasma chloride measurement (moles/volume)   

        106 mmol/L        

                                                

 

                    Carbon dioxide           20 mmol/L           21-32        

 

                          Serum or plasma anion gap determination (moles/volume)

           12 mmol/L      

                                        5-14        

 

                          Serum or plasma urea nitrogen measurement (mass/volume

)           18 mg/dL      

                                        7-18        

 

                          Serum or plasma creatinine measurement (mass/volume)  

         0.90 mg/dL       

                                        0.60-1.30        

 

                    Serum or plasma urea nitrogen/creatinine mass ratio         

  20                  NRG 

       

 

                                        Serum or plasma creatinine measurement w

ith calculation of estimated glomerular 

filtration rate           >                         NRG        

 

                    Serum or plasma glucose measurement (mass/volume)           

95 mg/dL            

        

 

                    Serum or plasma calcium measurement (mass/volume)           

9.4 mg/dL           

8.5-10.1        

 

                          Serum or plasma total bilirubin measurement (mass/volu

me)           0.5 mg/dL   

                                        0.1-1.0        

 

                                        Serum or plasma alkaline phosphatase augustin

surement (enzymatic activity/volume)    

                          65 U/L                            

 

                                        Serum or plasma aspartate aminotransfera

se measurement (enzymatic 

activity/volume)           25 U/L                    5-34        

 

                                        Serum or plasma alanine aminotransferase

 measurement (enzymatic activity/volume)

                          48 U/L                    0-55        

 

                    Serum or plasma protein measurement (mass/volume)           

8.0 g/dL            

6.4-8.2        

 

                    Serum or plasma albumin measurement (mass/volume)           

4.6 g/dL            

3.2-4.5        

 

                                        Serum or plasma troponin i.cardiac measu

rement (mass/volume) - 20 21:15   

      

 

                          Serum or plasma troponin i.cardiac measurement (mass/v

olume)           < ng/mL  

                                        <0.028        



                              



Encounters

      



                ACCT No.           Visit Date/Time           Discharge          

 Status         

             Pt. Type           Provider           Facility           Loc./Unit 

          

Complaint        

 

                [id number]           10/28/2014 17:59:00           10/28/2014 23:59:

59           CLS

                Outpatient           MAURISIO BARR DO                           

         

                                                 

 

                [id number]           2014 18:24:00           2014 23:59:

59           CLS

                Outpatient           KEVIN MENJIVAR MD                          

         

                                                 

 

                [id number]           2013 08:00:00           2013 23:59:

59           CLS

                Outpatient           [facility] MAURISIO DEMPSEY                           

         

                                                 

 

                    [id number]           2020 20:50:00           

020 22:08:00        

                DIS             Outpatient           MEAGAN RICKS        

   [facility]           ER                        [place],        

 

             [id number]           2020 21:28:00                        A

CT           

Emergency                 LYRIC VICENTE MD           [facility]                 ER                        [address]       

 

 

             [id number]           2020 10:05:00                        ACT     

      

Outpatient           KEVIN GRIMES                               [facility]

[facility] 

CARE                                             

 

             [id number]           2020 08:00:00                              

       Document

 Registration                                                                   

 

 

             [id number]           2020 10:30:00                              

       Document

 Registration                                                                   

 

 

             [id number]           2019 16:40:00                              

       Document

 Registration

## 2020-08-12 ENCOUNTER — HOSPITAL ENCOUNTER (EMERGENCY)
Dept: HOSPITAL 75 - ER | Age: 30
LOS: 1 days | Discharge: HOME | End: 2020-08-13
Payer: SELF-PAY

## 2020-08-12 VITALS — DIASTOLIC BLOOD PRESSURE: 93 MMHG | SYSTOLIC BLOOD PRESSURE: 144 MMHG

## 2020-08-12 VITALS — BODY MASS INDEX: 40.46 KG/M2 | WEIGHT: 263.89 LBS | HEIGHT: 67.72 IN

## 2020-08-12 DIAGNOSIS — F41.9: ICD-10-CM

## 2020-08-12 DIAGNOSIS — E11.649: Primary | ICD-10-CM

## 2020-08-12 DIAGNOSIS — I10: ICD-10-CM

## 2020-08-12 PROCEDURE — 82962 GLUCOSE BLOOD TEST: CPT

## 2020-08-12 NOTE — XMS REPORT
Continuity of Care Document

                             Created on: 2020



FRANCES BURTON

External Reference #: [id number]

: 1990

Sex: Male



Demographics





                          Address                   [address] [place]  [address]

 

                          Home Phone                [phone] x

 

                          Preferred Language        Unknown

 

                          Marital Status            Unknown

 

                          Yarsani Affiliation     Unknown

 

                          Race                      Unknown

 

                          Ethnic Group              Unknown





Author





                          Organization              Unknown

 

                          Address                   Unknown

 

                          Phone                     Unavailable



              



Allergies

      



             Active           Description           Code           Type         

  Severity   

                Reaction           Onset           Reported/Identified          

 

Relationship to Patient                 Clinical Status        

 

                Yes             No Known Drug Allergies           [id number]    

       Drug 

Allergy           Unknown           N/A                             2020  

      

                                                             



                  



Medications

      



There is no data.                  



Problems

      



             Date Dx Coded           Attending           Type           Code    

       

Diagnosis                               Diagnosed By        

 

             2013           MAURISIO BARR DO                        401.1 

          

HYPERTENSION, BENIGN ESSENTIAL                    

 

             2013           MAURISIO BARR DO                        V70.0 

          

ROUTINE GENERAL MEDICAL EXAMINATION AT A HEALTH CARE FACILITY                   



 

             2013           KEVIN MENJIVAR MD                        401.1

           

HYPERTENSION, BENIGN ESSENTIAL                    

 

             2013           KEVIN MENJIVAR MD                        V70.0

           

ROUTINE GENERAL MEDICAL EXAMINATION AT A HEALTH CARE FACILITY                   



 

             2013           MAURISIO BARR DO                        401.1 

          

HYPERTENSION, BENIGN ESSENTIAL                    

 

             2013           MAURISIO BRAR DO                        V70.0 

          

ROUTINE GENERAL MEDICAL EXAMINATION AT A HEALTH CARE FACILITY                   



 

             2013           MAURISIO BARR DO                        272.1 

          

HYPERTRIGLYCERIDEMIA                             

 

             2013           KEVIN MENJIVAR MD                        272.1

           

HYPERTRIGLYCERIDEMIA                             

 

             2013           MAURISIO BARR DO                        272.1 

          

HYPERTRIGLYCERIDEMIA                             

 

             2014           KEVIN MENJIVAR MD                        V81.1

           

HYPERTENSION SCREENING                           

 

             2014           MAURISIO BARR DO                        V81.1 

          

HYPERTENSION SCREENING                           

 

             10/28/2014           MAURISIO BARR DO                        465.9 

          

UPPER RESPIRATORY INFECTION                      

 

             10/28/2014           MAURISIO BARR DO                        787.01

           

NAUSEA WITH VOMITING                             

 

             10/28/2014           MAURISIO BARR DO                        787.91

           

DIARRHEA                                         

 

             2020           MEAGAN RICKS           Ot           E66

.9           

OBESITY, UNSPECIFIED                             

 

             2020           MEAGAN RICKS           Ot           F41

.9           

ANXIETY DISORDER, UNSPECIFIED                    

 

             2020           MEAGAN RICKS           Ot           E66

.9           

OBESITY, UNSPECIFIED                             

 

             2020           MEAGAN RICKS           Ot           F41

.9           

ANXIETY DISORDER, UNSPECIFIED                    

 

             2020           JULES URENA, LYRIC YADAV           Ot           E11.

9           

TYPE 2 DIABETES MELLITUS WITHOUT COMPLIC                    

 

             2020           LYRIC VICENTE MD           Ot           F41.

9           

ANXIETY DISORDER, UNSPECIFIED                    

 

             2020           LYRIC VICENTE MD           Ot           I10 

          

ESSENTIAL (PRIMARY) HYPERTENSION                    

 

             2020           LYRIC VICENTE MD           Ot           R20.

2           

PARESTHESIA OF SKIN                              

 

                2020           LYRIC VICENTE MD, Ot            

  T43.595A         

                          ADVERSE EFFECT OF OTH ANTIPSYCHOTICS AND              

      



                                                              



Procedures

      



                Code            Description           Performed By           Per

formed On        

 

                                      26627                                 ROUT

INE VENIPUNCTURE          

                                                    2013        

 

                                32411                                 CMP       

                    

                                        2013        

 

                                      19660                                 LIPI

D PANEL                   

                                                    2013        

 

                                26412                                 TSH       

                    

                                        2013        

 

                                      2000F                                 BLOO

D PRESSURE CHECK          

                                                    2014        

 

                                      77718                                 INFL

[facility] (IN-HOUSE)    

                                                    10/28/2014        



                            



Results

      



                    Test                Result              Range        

 

                                        TSH - 19 16:01         

 

                    TSH                 1.98 mIU/L           0.40-4.50        

 

                                        COVID-19 ([facility]) - 20 13:04       

  

 

                                        CMP - 20 08:09         

 

                    GLUCOSE             111 mg/dL           65-99        

 

                    UREA NITROGEN (BUN)           15 mg/dL            7-25      

  

 

                    CREATININE           0.73 mg/dL           0.60-1.35        

 

                    eGFR NON-AFR. AMERICAN           123 mL/min/1.73m2          

 > OR = 60        

 

                    eGFR            142 mL/min/1.73m2           

> OR = 60        

 

                    BUN/CREATININE RATIO           NOT APPLICABLE (calc)        

   6-22        

 

                    SODIUM              139 mmol/L           135-146        

 

                    POTASSIUM           3.8 mmol/L           3.5-5.3        

 

                    CHLORIDE            104 mmol/L                   

 

                    CARBON DIOXIDE           26 mmol/L           20-32        

 

                    CALCIUM             9.1 mg/dL           8.6-10.3        

 

                    PROTEIN, TOTAL           7.4 g/dL            6.1-8.1        

 

                    ALBUMIN             4.5 g/dL            3.6-5.1        

 

                    GLOBULIN            2.9 g/dL (calc)           1.9-3.7       

 

 

                    ALBUMIN/GLOBULIN RATIO           1.6 (calc)           1.0-2.

5        

 

                    BILIRUBIN, TOTAL           0.6 mg/dL           0.2-1.2      

  

 

                    ALKALINE PHOSPHATASE           64 U/L                 

     

 

                    AST                 22 U/L              10-40        

 

                    ALT                 47 U/L              9-46        

 

                                        CBC - 20 08:09         

 

                    WHITE BLOOD CELL COUNT           6.6 Thousand/uL           3

.8-10.8        

 

                    RED BLOOD CELL COUNT           5.53 Million/uL           4.2

0-5.80        

 

                    HEMOGLOBIN           15.9 g/dL           13.2-17.1        

 

                    HEMATOCRIT           48.5 %              38.5-50.0        

 

                    MCV                 87.7 fL             80.0-100.0        

 

                    MCH                 28.8 pg             27.0-33.0        

 

                    MCHC                32.8 g/dL           32.0-36.0        

 

                    RDW                 13.2 %              11.0-15.0        

 

                    PLATELET COUNT           205 Thousand/uL           140-400  

      

 

                    MPV                 11.6 fL             7.5-12.5        

 

                    ABSOLUTE NEUTROPHILS           4679 cells/uL           1500-

7800        

 

                    ABSOLUTE LYMPHOCYTES           1214 cells/uL           850-3

900        

 

                    ABSOLUTE MONOCYTES           554 cells/uL           200-950 

       

 

                    ABSOLUTE EOSINOPHILS           119 cells/uL           

        

 

                    ABSOLUTE BASOPHILS           33 cells/uL           0-200    

    

 

                    NEUTROPHILS           70.9 %              NRG        

 

                    LYMPHOCYTES           18.4 %              NRG        

 

                    MONOCYTES           8.4 %               NRG        

 

                    EOSINOPHILS           1.8 %               NRG        

 

                    BASOPHILS           0.5 %               NRG        

 

                                        Complete blood count (CBC) with automate

d white blood cell (WBC) differential - 

20 21:15         

 

                          Blood leukocytes automated count (number/volume)      

     9.0 10*3/uL          

                                        4.3-11.0        

 

                          Blood erythrocytes automated count (number/volume)    

       5.26 10*6/uL       

                                        4.35-5.85        

 

                    Venous blood hemoglobin measurement (mass/volume)           

15.0 g/dL           

13.3-17.7        

 

                    Blood hematocrit (volume fraction)           45 %           

     40-54        

 

                    Automated erythrocyte mean corpuscular volume           85 [

foz_us]           

80-99        

 

                                        Automated erythrocyte mean corpuscular h

emoglobin (mass per erythrocyte)        

                          29 pg                     25-34        

 

                                        Automated erythrocyte mean corpuscular h

emoglobin concentration measurement 

(mass/volume)             34 g/dL                   32-36        

 

                    Automated erythrocyte distribution width ratio           12.

9 %              10.0-

14.5        

 

                    Automated blood platelet count (count/volume)           193 

10*3/uL           

130-400        

 

                          Automated blood platelet mean volume measurement      

     11.0 [foz_us]        

                                        7.4-10.4        

 

                    Automated blood neutrophils/100 leukocytes           73 %   

             42-75       

 

 

                    Automated blood lymphocytes/100 leukocytes           18 %   

             12-44       

 

 

                    Blood monocytes/100 leukocytes           8 %                

 0-12        

 

                    Automated blood eosinophils/100 leukocytes           1 %    

             0-10        

 

                    Automated blood basophils/100 leukocytes           0 %      

           0-10        

 

                    Blood neutrophils automated count (number/volume)           

6.5 10*3            

1.8-7.8        

 

                    Blood lymphocytes automated count (number/volume)           

1.6 10*3            

1.0-4.0        

 

                    Blood monocytes automated count (number/volume)           0.

7 10*3            

0.0-1.0        

 

                    Automated eosinophil count           0.1 10*3/uL           0

.0-0.3        

 

                    Automated blood basophil count (count/volume)           0.0 

10*3/uL           

0.0-0.1        

 

                                        Fibrin D-dimer FEU measurement in platel

et poor plasma (mass/volume) - 20 

21:15         

 

                          Fibrin D-dimer FEU measurement in platelet poor plasma

 (mass/volume)           <

 ug/mL                                  0.00-0.49        

 

                                        Comprehensive metabolic panel - 20

 21:15         

 

                          Serum or plasma sodium measurement (moles/volume)     

      138 mmol/L          

                                        135-145        

 

                          Serum or plasma potassium measurement (moles/volume)  

         3.6 mmol/L       

                                        3.6-5.0        

 

                          Serum or plasma chloride measurement (moles/volume)   

        106 mmol/L        

                                                

 

                    Carbon dioxide           20 mmol/L           21-32        

 

                          Serum or plasma anion gap determination (moles/volume)

           12 mmol/L      

                                        5-14        

 

                          Serum or plasma urea nitrogen measurement (mass/volume

)           18 mg/dL      

                                        7-18        

 

                          Serum or plasma creatinine measurement (mass/volume)  

         0.90 mg/dL       

                                        0.60-1.30        

 

                    Serum or plasma urea nitrogen/creatinine mass ratio         

  20                  NRG 

       

 

                                        Serum or plasma creatinine measurement w

ith calculation of estimated glomerular 

filtration rate           >                         NRG        

 

                    Serum or plasma glucose measurement (mass/volume)           

95 mg/dL            

        

 

                    Serum or plasma calcium measurement (mass/volume)           

9.4 mg/dL           

8.5-10.1        

 

                          Serum or plasma total bilirubin measurement (mass/volu

me)           0.5 mg/dL   

                                        0.1-1.0        

 

                                        Serum or plasma alkaline phosphatase augustin

surement (enzymatic activity/volume)    

                          65 U/L                            

 

                                        Serum or plasma aspartate aminotransfera

se measurement (enzymatic 

activity/volume)           25 U/L                    5-34        

 

                                        Serum or plasma alanine aminotransferase

 measurement (enzymatic activity/volume)

                          48 U/L                    0-55        

 

                    Serum or plasma protein measurement (mass/volume)           

8.0 g/dL            

6.4-8.2        

 

                    Serum or plasma albumin measurement (mass/volume)           

4.6 g/dL            

3.2-4.5        

 

                                        Serum or plasma troponin i.cardiac measu

rement (mass/volume) - 20 21:15   

      

 

                          Serum or plasma troponin i.cardiac measurement (mass/v

olume)           < ng/mL  

                                        <0.028        



                              



Encounters

      



                ACCT No.           Visit Date/Time           Discharge          

 Status         

             Pt. Type           Provider           Facility           Loc./Unit 

          

Complaint        

 

                [id number]           10/28/2014 17:59:00           10/28/2014 23:59:

59           CLS

                Outpatient           RIP BARR DOA [facility]                           

         

                                                 

 

                [id number]           2014 18:24:00           2014 23:59:

59           CLS

                Outpatient           KEVIN MENJIVAR MD                          

         

                                                 

 

                [id number]           2013 08:00:00           2013 23:59:

59           CLS

                Outpatient           [facility]                           

         

                                                 

 

                    [id number]           2020 21:28:00            22:39:00        

                DIS             Outpatient           LYRIC VICENTE MD         

  [facility]           ER                        [address]  

      

 

                    [id number]           2020 20:50:00            22:08:00        

                DIS             Outpatient           MEAGAN RICKS        

   [facility]           ER                        P[place],        

 

             [id number]           2020 23:43:00                        A

CT           

Emergency                 REGINALD GINNY DEMPSEY           [facility]  

                          ER                        [address]        

 

             [id number]           08/10/2020 13:00:00                        ACT     

      

Outpatient           KEVIN GRIMES                               [facility] 

                                                 

 

             [id number]           2020 08:00:00                              

       Document

 Registration                                                                   

 

 

             [id number]           2020 10:30:00                              

       Document

 Registration                                                                   

 

 

             [id number]           2019 16:40:00                              

       Document

 Registration

## 2020-08-13 NOTE — ED GENERAL
General


Chief Complaint:  Glucose Problems


Stated Complaint:  LOW BLOOD SUGAR 76


Nursing Triage Note:  


patient states woke up from sleep sweaty and shakey. states last meal was at 


2130 small snack. patient states FSBS was 76 then waited and did not treat it, 


rechecked 84. patient denies eating, states just came here.


Nursing Sepsis Screen:  No Definite Risk


Source of Information:  Patient





History of Present Illness


Date Seen by Provider:  Aug 12, 2020


Time Seen by Provider:  23:49


Initial Comments


PT ARRIVES VIA POV FROM HOME


STATES HE HAS "LOW BLOOD SUGAR--76", RECHECKED IT AND IT WAS "STILL LOW--84"


STATES HE WENT TO BED AT 2200, BLOOD GLUCOSE WAS 87 WHEN HE WENT TO BED. 


HAD CEREAL AT 2130, AND BLOOD GLUCOSE WAS 80 AT THAT TIME. 


HAD SUPPER AT 1900, OF SOME KIND OF SOUP WITH HAMBUR[employer] MEAT AND VEGETABLES





STATES HE WOKE UP JUST PRIOR TO ARRIVAL AND WAS SWEATY AND SHAKEY, AND CHECKED 

HIS BLOOD SUGAR AND IT WAS "LOW" AT 76


HE DID NOT EAT OR DRINK ANYTHING AT ALL, IN ORDER TO TRY TO GET HIS BLOOD 

GLUCOSE UP, JUST CAME HERE INSTEAD


IS NOT HAVING THOSE SYMPTOMS NOW, AND CURRENTLY DOES NOT HAVE ANY SYMPTOMS AT 

ALL


NO NAUSEA/VOMITING/DIARRHEA/ABDOMINAL PAIN 


NO FEVER


NO URINARY SYMPTOMS


NO HEADACHE


NO DIZZINESS/SYNCOPE


NO CHEST PAIN/SHORTNESS OF BREATH/PALPITATIONS





NO FEVER OR RECENT ILLNESS





PT HAS BEEN DIABETIC FOR > 10 YEARS. 


PT STATES "LATELY I HAVEN'T BEEN EATING VERY MUCH" STATES HE HAS LOST WEIGHT IN 

THE LAST "2-3 WEEKS" --STATES HIS WEIGHT DROPPED FROM 280 DOWN 


STATES HE HAD BEEN ON METFORMIN AND GLYBURIDE. 


2 MONTHS AGO, HE WAS ALSO STARTED ON INVOKANA. HIS METFORMIN DOSE WAS CONTINUED 

AT 1000 MG BID. 


2 WEEKS AGO, HIS GLYBURIDE WAS STOPPED DUE TO "LOW BLOOD SUGARS"





[facility]





PT WAS HERE IN ER LAST WEEK FOR ANXIETY





[facility]





Allergies and Home Medications


Allergies


Coded Allergies:  


     No Known Drug Allergies (Unverified , 8/5/20)





Home Medications


Lisinopril 5 Mg Tablet, Unknown Dose PO DAILY, (Reported)


Lorazepam 0.5 Mg Tablet, 0.5 MG PO BID PRN for ANXIETY


   Prescribed by: MEAGAN RICKS on 8/5/20 2205





Patient Home Medication List


Home Medication List Reviewed:  Yes





Review of Systems


Review of Systems


Constitutional:  see HPI, diaphoresis


EENTM:  no symptoms reported


Respiratory:  no symptoms reported


Cardiovascular:  no symptoms reported


Gastrointestinal:  no symptoms reported


Genitourinary:  no symptoms reported


Musculoskeletal:  no symptoms reported


Skin:  no symptoms reported


Psychiatric/Neurological:  Anxiety


Hematologic/Lymphatic:  No Symptoms Reported


Immunological/Allergic:  no symptoms reported





Past Medical-Social-Family Hx


Past Med/Social Hx:  Reviewed and Corrections made


Patient Social History


Alcohol Use:  Denies Use


Recreational Drug Use:  No


Smoking Status:  Never a Smoker


2nd Hand Smoke Exposure:  No


Recent Foreign Travel:  No


Contact w/Someone Who Travel:  No


Recent Infectious Disease Expo:  No


Recent Hopitalizations:  No


Physical Abuse:  No


Sexual Abuse:  No


Mistreated:  No


Fear:  No





Immunizations Up To Date


Tetanus Booster (TDap):  Unknown





Seasonal Allergies


Seasonal Allergies:  No





Past Medical History


Surgeries:  No


Respiratory:  No


Cardiac:  Yes


High Cholesterol, Hypertension


Neurological:  No


Genitourinary:  No


Gastrointestinal:  No


Musculoskeletal:  No


Endocrine:  Yes


Diabetes, Non-Insulin dep


HEENT:  No


Cancer:  No


Psychosocial:  Yes


Anxiety


Integumentary:  No


Blood Disorders:  No





Physical Exam


Vital Signs





Vital Signs - First Documented








 8/12/20





 23:52


 


Temp 36.8


 


Pulse 89


 


Resp 20


 


B/P (MAP) 144/93 (110)


 


Pulse Ox 95


 


O2 Delivery Room Air





Capillary Refill : Less Than 3 Seconds


Height, Weight, BMI


Height: '"


Weight: lbs. oz. kg; 40.00 BMI


Method:


General Appearance:  No Apparent Distress, WD/WN, Anxious


HEENT:  PERRL/EOMI, Normal ENT Inspection


Neck:  Normal Inspection


Respiratory:  Normal Breath Sounds, No Accessory Muscle Use, No Respiratory 

Distress


Cardiovascular:  Regular Rate, Rhythm, No Edema, No JVD, No Murmur, Normal 

Peripheral Pulses


Gastrointestinal:  Non Tender, Soft


Back:  Normal Inspection


Extremity:  Normal Inspection


Neurologic/Psychiatric:  Alert, Oriented x3, No Motor/Sensory Deficits, CNs II-

XII Norm as Tested, Other (ANXIOUS)





Progress/Results/Core Measures


Suspected Sepsis


Recent Fever Within 48 Hours:  No


Infection Criteria Present:  None


New/Unexplained  Altered Menta:  No


Sepsis Screen:  No Definite Risk


SIRS


Temperature: 


Pulse: 89 


Respiratory Rate: 20


 


Blood Pressure 144 /93 


Mean: 110





Results/Orders


Lab Results





My Orders





Vital Signs/I&O


Capillary Refill : Less Than 3 Seconds








Blood Pressure Mean:                    110











Point of Care Testing


Finger Stick Blood Glucose:  110


Blood Glucose Action Taken:  DR. MONTELONGO NOTIFIED.


Progress Note :  


Progress Note


ACCUCHECK 110


PT IS ASYMPTOMATIC FOR ENTIRE ER STAY





EXTENSIVE  DISCUSSION WITH DIABETIC MEDICATIONS, FOOD CHOICES, WHAT TO DO WITH 

HYPOGLYCEMIA, ETC. 


IN DISCUSSION ABOUT DIET, PT'S DIET  MOSTLY CONSISTS OF CARBOHYDRATES--ADVISED 

OF NEED FOR INCREASED PROTEIN, AND DECREASING HIS CARBOHYDRATE INTAKE.





Departure


Impression





   Primary Impression:  


   Hypoglycemia associated with diabetes


Disposition:  01 HOME, SELF-CARE


Condition:  Improved





Departure-Patient Inst.


Referrals:  


[facility] (PCP)


Primary Care Physician








KEVIN GRIMES (Family)


Primary Care Physician


Patient Instructions:  Diabetes and Diet, HYPOGLYCEMIA, Low Blood Sugar in 

People With Diabetes





Add. Discharge Instructions:  


INCREASE YOUR PROTEIN INTAKE, AND DECREASE YOUR CARBOHYDRATE INTAKE





TAKE YOUR MEDICATIONS AS PRESCRIBED





CHECK YOUR BLOOD SUGAR 4 TIMES A DAY--FASTING, 2 HOURS AFTER EATING AND AT 

BEDTIME





FOLLOW UP WITH [facility] THIS WEEK FOR FURTHER CARE





All discharge instructions reviewed with patient and/or family. Voiced understa

nding.











GINNY MONTELONGO DO                 Aug 13, 2020 00:02

## 2021-10-15 ENCOUNTER — HOSPITAL ENCOUNTER (EMERGENCY)
Dept: HOSPITAL 75 - ER | Age: 31
Discharge: HOME | End: 2021-10-15
Payer: SELF-PAY

## 2021-10-15 VITALS — BODY MASS INDEX: 43.94 KG/M2 | HEIGHT: 67.99 IN | WEIGHT: 289.91 LBS

## 2021-10-15 VITALS — DIASTOLIC BLOOD PRESSURE: 95 MMHG | SYSTOLIC BLOOD PRESSURE: 144 MMHG

## 2021-10-15 DIAGNOSIS — Z79.84: ICD-10-CM

## 2021-10-15 DIAGNOSIS — E87.2: Primary | ICD-10-CM

## 2021-10-15 DIAGNOSIS — Z79.899: ICD-10-CM

## 2021-10-15 DIAGNOSIS — F41.9: ICD-10-CM

## 2021-10-15 DIAGNOSIS — E66.9: ICD-10-CM

## 2021-10-15 DIAGNOSIS — I10: ICD-10-CM

## 2021-10-15 LAB
ALBUMIN SERPL-MCNC: 4.6 GM/DL (ref 3.2–4.5)
ALP SERPL-CCNC: 94 U/L (ref 40–136)
ALT SERPL-CCNC: 68 U/L (ref 0–55)
APTT PPP: YELLOW S
BACTERIA #/AREA URNS HPF: (no result) /HPF
BARBITURATES UR QL: NEGATIVE
BASOPHILS # BLD AUTO: 0.1 10^3/UL (ref 0–0.1)
BASOPHILS NFR BLD AUTO: 1 % (ref 0–10)
BENZODIAZ UR QL SCN: NEGATIVE
BILIRUB SERPL-MCNC: 0.4 MG/DL (ref 0.1–1)
BILIRUB UR QL STRIP: NEGATIVE
BUN/CREAT SERPL: 11
CALCIUM SERPL-MCNC: 10 MG/DL (ref 8.5–10.1)
CHLORIDE SERPL-SCNC: 102 MMOL/L (ref 98–107)
CO2 SERPL-SCNC: 17 MMOL/L (ref 21–32)
COCAINE UR QL: NEGATIVE
CREAT SERPL-MCNC: 1.09 MG/DL (ref 0.6–1.3)
EOSINOPHIL # BLD AUTO: 0.2 10^3/UL (ref 0–0.3)
EOSINOPHIL NFR BLD AUTO: 2 % (ref 0–10)
FIBRINOGEN PPP-MCNC: CLEAR MG/DL
GFR SERPLBLD BASED ON 1.73 SQ M-ARVRAT: 78 ML/MIN
GLUCOSE SERPL-MCNC: 200 MG/DL (ref 70–105)
GLUCOSE UR STRIP-MCNC: (no result) MG/DL
HCT VFR BLD CALC: 51 % (ref 40–54)
HGB BLD-MCNC: 17.3 G/DL (ref 13.3–17.7)
KETONES UR QL STRIP: NEGATIVE
LEUKOCYTE ESTERASE UR QL STRIP: NEGATIVE
LYMPHOCYTES # BLD AUTO: 2.4 10^3/UL (ref 1–4)
LYMPHOCYTES NFR BLD AUTO: 25 % (ref 12–44)
MANUAL DIFFERENTIAL PERFORMED BLD QL: NO
MCH RBC QN AUTO: 29 PG (ref 25–34)
MCHC RBC AUTO-ENTMCNC: 34 G/DL (ref 32–36)
MCV RBC AUTO: 85 FL (ref 80–99)
METHADONE UR QL SCN: NEGATIVE
METHAMPHETAMINE SCREEN URINE S: NEGATIVE
MONOCYTES # BLD AUTO: 0.8 10^3/UL (ref 0–1)
MONOCYTES NFR BLD AUTO: 8 % (ref 0–12)
NEUTROPHILS # BLD AUTO: 5.9 10^3/UL (ref 1.8–7.8)
NEUTROPHILS NFR BLD AUTO: 62 % (ref 42–75)
NITRITE UR QL STRIP: NEGATIVE
OPIATES UR QL SCN: NEGATIVE
OTHER ELEMENTS URNS MICRO: (no result) /HPF
OXYCODONE UR QL: NEGATIVE
PH UR STRIP: 6 [PH] (ref 5–9)
PLATELET # BLD: 213 10^3/UL (ref 130–400)
PMV BLD AUTO: 11.7 FL (ref 9–12.2)
POTASSIUM SERPL-SCNC: 3.5 MMOL/L (ref 3.6–5)
PROPOXYPH UR QL: NEGATIVE
PROT SERPL-MCNC: 8.6 GM/DL (ref 6.4–8.2)
PROT UR QL STRIP: NEGATIVE
RBC #/AREA URNS HPF: (no result) /HPF
SODIUM SERPL-SCNC: 139 MMOL/L (ref 135–145)
SP GR UR STRIP: 1.01 (ref 1.02–1.02)
SQUAMOUS #/AREA URNS HPF: (no result) /HPF
TRICYCLICS UR QL SCN: NEGATIVE
WBC # BLD AUTO: 9.6 10^3/UL (ref 4.3–11)
WBC #/AREA URNS HPF: (no result) /HPF

## 2021-10-15 PROCEDURE — 80053 COMPREHEN METABOLIC PANEL: CPT

## 2021-10-15 PROCEDURE — 83605 ASSAY OF LACTIC ACID: CPT

## 2021-10-15 PROCEDURE — 85025 COMPLETE CBC W/AUTO DIFF WBC: CPT

## 2021-10-15 PROCEDURE — 80320 DRUG SCREEN QUANTALCOHOLS: CPT

## 2021-10-15 PROCEDURE — 82947 ASSAY GLUCOSE BLOOD QUANT: CPT

## 2021-10-15 PROCEDURE — 80329 ANALGESICS NON-OPIOID 1 OR 2: CPT

## 2021-10-15 PROCEDURE — 81000 URINALYSIS NONAUTO W/SCOPE: CPT

## 2021-10-15 PROCEDURE — 82010 KETONE BODYS QUAN: CPT

## 2021-10-15 PROCEDURE — 71045 X-RAY EXAM CHEST 1 VIEW: CPT

## 2021-10-15 PROCEDURE — 36415 COLL VENOUS BLD VENIPUNCTURE: CPT

## 2021-10-15 PROCEDURE — 80306 DRUG TEST PRSMV INSTRMNT: CPT

## 2021-10-15 PROCEDURE — 99282 EMERGENCY DEPT VISIT SF MDM: CPT

## 2021-10-15 NOTE — ED GENERAL
General


Stated Complaint:  HX DIABETES/SHAKING/HEAVY BREATHING


Source of Information:  Patient


Exam Limitations:  No Limitations


 (MEAGAN RICKS)





History of Present Illness


Date Seen by Provider:  Oct 15, 2021


Time Seen by Provider:  15:30


Initial Comments


To ER with c/o heavy breathing, feeling weird. This began about an hour ago 

while working. History of diabetes on Invokana and metformin.


Timing/Duration:  1-2 Days


Severity:  Moderate


Associated Systoms:  No Cough, No Headaches, No Malaise, No Nausea/Vomiting 

(MEAGAN RICKS)





Allergies and Home Medications


Allergies


Coded Allergies:  


     No Known Drug Allergies (Unverified , 8/5/20)





Patient Home Medication List


Home Medication List Reviewed:  Yes


 (MEAGAN RICKS)


Canagliflozin (Invokana) 100 Mg Tablet, Unknown Dose PO, (Reported)


   Entered as Reported by: MANOHAR DELCID on 8/9/20 2151


Lisinopril (Lisinopril) 5 Mg Tablet, Unknown Dose PO DAILY, (Reported)


   Entered as Reported by: MANOHAR DELCID on 8/9/20 2151


Lorazepam (Ativan) 0.5 Mg Tablet, 0.5 MG PO BID PRN for ANXIETY


   Prescribed by: MEAGAN RICKS on 8/5/20 2205


Lorazepam (Ativan) 0.5 Mg Tablet, (Reported)


   Entered as Reported by: MANOHAR DELCID on 8/9/20 2145


Metformin HCl (Metformin HCl) 500 Mg Tablet, Unknown Dose PO, (Reported)


   Entered as Reported by: MANOHAR DELCID on 8/9/20 2151


Metformin HCl (Metformin HCl) 500 Mg Tablet, 500 MG PO BID


   Prescribed by: MEAGAN RICKS on 10/15/21 1812


Metoprolol Succinate (Toprol Xl) 50 Mg Tab.er.24h, 50 MG PO DAILY


   Prescribed by: MEAGAN RICKS on 10/15/21 1812





Review of Systems


Review of Systems


Constitutional:  see HPI


EENTM:  see HPI


Respiratory:  no symptoms reported


Cardiovascular:  no symptoms reported


Genitourinary:  no symptoms reported


Musculoskeletal:  no symptoms reported


Skin:  no symptoms reported


Psychiatric/Neurological:  No Symptoms Reported


Hematologic/Lymphatic:  No Symptoms Reported


Immunological/Allergic:  no symptoms reported (MEAGAN RICKS)





Past Medical-Social-Family Hx


Immunizations Up To Date


Tetanus Booster (TDap):  Unknown


 (MEAAGN RICKS)





Seasonal Allergies


Seasonal Allergies:  No


 (MEAGAN RICKS)





Past Medical History


Surgeries:  No


Respiratory:  No


Cardiac:  Yes


High Cholesterol, Hypertension


Neurological:  No


Genitourinary:  No


Gastrointestinal:  No


Musculoskeletal:  No


Endocrine:  Yes


Diabetes, Non-Insulin dep


HEENT:  No


Cancer:  No


Psychosocial:  Yes


Anxiety


Integumentary:  No


Blood Disorders:  No


 (MEAGAN RICKS)





Physical Exam


Vital Signs





Vital Signs - First Documented








 10/15/21





 15:25


 


Temp 36.0


 


Pulse 140


 


Resp 22


 


B/P (MAP) 171/118 (135)


 


Pulse Ox 95


 


O2 Delivery Room Air








 (GINNY MONTELONGO DO)


Vital Signs


Capillary Refill :  


 (MEAGAN RICKS)


Height, Weight, BMI


Height: '"


Weight: lbs. oz. kg; 40.00 BMI


Method:


General Appearance:  No Apparent Distress, WD/WN, Obese, Other (hypertensive at 

176/117  sinus not tachypneic, no kussmaul respirations. Alert and 

oriented, a little diaphoretic. )


Eyes:  Bilateral Eye Normal Inspection, Bilateral Eye PERRL, Bilateral Eye EOMI


Neck:  Full Range of Motion, Normal Inspection


Respiratory:  Normal Breath Sounds, No Accessory Muscle Use, No Respiratory 

Distress


Cardiovascular:  Regular Rate, Rhythm, Normal Peripheral Pulses


Gastrointestinal:  Normal Bowel Sounds, Non Tender, Soft


Extremity:  Normal Capillary Refill, Normal Inspection


Neurologic/Psychiatric:  Alert, Oriented x3


Skin:  Normal Color, Warm/Dry (MEAGAN RICKS)





Focused Exam


Lactate Level


10/15/21 16:07: Lactic Acid Level 3.86*H


10/15/21 17:50: Lactic Acid Level 3.26*H


 (GINNY MONTELONGO DO)


Lactic Acid Level





Laboratory Tests








Test


 10/15/21


16:07 10/15/21


17:50


 


Lactic Acid Level


 3.86 MMOL/L


(0.50-2.00)  *H 3.26 MMOL/L


(0.50-2.00)  *H





 (REGINALDGINNY K DO)





Progress/Results/Core Measures


Suspected Sepsis


SIRS


Temperature: 


Pulse:  


Respiratory Rate: 


 


Laboratory Tests


10/15/21 15:27: White Blood Count 9.6


Blood Pressure  / 


Mean: 


 





10/15/21 16:07: Lactic Acid Level 3.86*H


10/15/21 17:50: 


Laboratory Tests


10/15/21 15:27: 


Creatinine 1.09, Platelet Count 213, Total Bilirubin 0.4


 (MEAGAN RICKS)





Results/Orders


Lab Results





Laboratory Tests








Test


 10/15/21


15:27 10/15/21


15:50 10/15/21


16:07 10/15/21


17:50 Range/Units


 


 


White Blood Count


 9.6 


 


 


 


 4.3-11.0


10^3/uL


 


Red Blood Count


 6.04 H


 


 


 


 4.30-5.52


10^6/uL


 


Hemoglobin 17.3     13.3-17.7  g/dL


 


Hematocrit 51     40-54  %


 


Mean Corpuscular Volume 85     80-99  fL


 


Mean Corpuscular Hemoglobin 29     25-34  pg


 


Mean Corpuscular Hemoglobin


Concent 34 


 


 


 


 32-36  g/dL





 


Red Cell Distribution Width 12.3     10.0-14.5  %


 


Platelet Count


 213 


 


 


 


 130-400


10^3/uL


 


Mean Platelet Volume 11.7     9.0-12.2  fL


 


Immature Granulocyte % (Auto) 2      %


 


Neutrophils (%) (Auto) 62     42-75  %


 


Lymphocytes (%) (Auto) 25     12-44  %


 


Monocytes (%) (Auto) 8     0-12  %


 


Eosinophils (%) (Auto) 2     0-10  %


 


Basophils (%) (Auto) 1     0-10  %


 


Neutrophils # (Auto)


 5.9 


 


 


 


 1.8-7.8


10^3/uL


 


Lymphocytes # (Auto)


 2.4 


 


 


 


 1.0-4.0


10^3/uL


 


Monocytes # (Auto)


 0.8 


 


 


 


 0.0-1.0


10^3/uL


 


Eosinophils # (Auto)


 0.2 


 


 


 


 0.0-0.3


10^3/uL


 


Basophils # (Auto)


 0.1 


 


 


 


 0.0-0.1


10^3/uL


 


Immature Granulocyte # (Auto)


 0.2 H


 


 


 


 0.0-0.1


10^3/uL


 


Sodium Level 139     135-145  MMOL/L


 


Potassium Level 3.5 L    3.6-5.0  MMOL/L


 


Chloride Level 102       MMOL/L


 


Carbon Dioxide Level 17 L    21-32  MMOL/L


 


Anion Gap 20 H    5-14  MMOL/L


 


Blood Urea Nitrogen 12     7-18  MG/DL


 


Creatinine


 1.09 


 


 


 


 0.60-1.30


MG/DL


 


Estimat Glomerular Filtration


Rate 78 


 


 


 


  





 


BUN/Creatinine Ratio 11      


 


Glucose Level 200 H      MG/DL


 


Glucometer 206 H      MG/DL


 


Calcium Level 10.0     8.5-10.1  MG/DL


 


Corrected Calcium      8.5-10.1  MG/DL


 


Total Bilirubin 0.4     0.1-1.0  MG/DL


 


Aspartate Amino Transf


(AST/SGOT) 31 


 


 


 


 5-34  U/L





 


Alanine Aminotransferase


(ALT/SGPT) 68 H


 


 


 


 0-55  U/L





 


Alkaline Phosphatase 94       U/L


 


Total Protein 8.6 H    6.4-8.2  GM/DL


 


Albumin 4.6 H    3.2-4.5  GM/DL


 


Beta-Hydroxybutyrate (Chem


panel) 0.22 


 


 


 


 0.00-0.27


MMOL/L


 


Salicylates Level < 5.0 L    5.0-20.0  MG/DL


 


Serum Alcohol < 10     <10  MG/DL


 


Urine Color  YELLOW     


 


Urine Clarity  CLEAR     


 


Urine pH  6.0    5-9  


 


Urine Specific Gravity  1.015 L   1.016-1.022  


 


Urine Protein  NEGATIVE    NEGATIVE  


 


Urine Glucose (UA)  3+ H   NEGATIVE  


 


Urine Ketones  NEGATIVE    NEGATIVE  


 


Urine Nitrite  NEGATIVE    NEGATIVE  


 


Urine Bilirubin  NEGATIVE    NEGATIVE  


 


Urine Urobilinogen  0.2    < = 1.0  MG/DL


 


Urine Leukocyte Esterase  NEGATIVE    NEGATIVE  


 


Urine RBC (Auto)  NEGATIVE    NEGATIVE  


 


Urine RBC  NONE     /HPF


 


Urine WBC  NONE     /HPF


 


Urine Squamous Epithelial


Cells 


 RARE 


 


 


  /HPF





 


Urine Crystals  NONE     /LPF


 


Urine Bacteria  TRACE     /HPF


 


Urine Casts  NONE     /LPF


 


Urine Mucus  NEGATIVE     /LPF


 


Urine Other  RARE SPERM     /HPF


 


Urine Culture Indicated  NO     


 


Urine Opiates Screen  NEGATIVE    NEGATIVE  


 


Urine Oxycodone Screen  NEGATIVE    NEGATIVE  


 


Urine Methadone Screen  NEGATIVE    NEGATIVE  


 


Urine Propoxyphene Screen  NEGATIVE    NEGATIVE  


 


Urine Barbiturates Screen  NEGATIVE    NEGATIVE  


 


Ur Tricyclic Antidepressants


Screen 


 NEGATIVE 


 


 


 NEGATIVE  





 


Urine Phencyclidine Screen  NEGATIVE    NEGATIVE  


 


Urine Amphetamines Screen  NEGATIVE    NEGATIVE  


 


Urine Methamphetamines Screen  NEGATIVE    NEGATIVE  


 


Urine Benzodiazepines Screen  NEGATIVE    NEGATIVE  


 


Urine Cocaine Screen  NEGATIVE    NEGATIVE  


 


Urine Cannabinoids Screen  NEGATIVE    NEGATIVE  


 


Lactic Acid Level


 


 


 3.86 *H


 3.26 *H


 0.50-2.00


MMOL/L





 (GINNY MONTELONGO DO)


Vital Signs/I&O











 10/15/21 10/15/21





 15:25 18:28


 


Temp 36.0 


 


Pulse 140 99


 


Resp 22 16


 


B/P (MAP) 171/118 (135) 144/95


 


Pulse Ox 95 97


 


O2 Delivery Room Air Room Air








 (GINNY MONTELONGO DO)


Vital Signs/I&O


Capillary Refill :  


 (MEAGAN RICKS)





Departure


Communication (Admissions)


1634-lactic acidosis likely secondary to physical exertion + metformin use. DO 

NOT suspect infectious cause. Will give 2L LR, recheck and after confirming a 

falling lactic acid level will dc to home to hydrate. States hes feeling better 

at this time. HR down to 112, BP down to 150/100. 


1808-he takes Metformin a gram twice daily though he informs me he has not 

actually taken it for about 3 or 4 days and today he did take it.  He states 

that when he has checked his sugar after not having taken it he is normally in t

he 130-170 range.  He believes he needs a lower dose of metformin which is 

certainly possible.  We have agreed to send in a prescription for 500 mg of 

Metformin twice daily.  Prior to initiating this he will check his sugars twice 

a day and if consistently below 200 he will not start the prescription.  If they

are above 200 then he will start it.  He is also on lisinopril 40 mg daily and 

has not missed any doses of that.  He is concerned about his persistent 

tachycardia with a heart rate of 102 and hypertension still at about 150/90 

after 2 doses of Lopressor.


 (MEAGAN RICKS)





Impression





   Primary Impression:  


   Lactic acidosis


Disposition:  01 HOME, SELF-CARE


Condition:  Improved





Departure-Patient Inst.


Decision time for Depature:  18:10


 (MEAGAN RICKS)


Referrals:  


[facility] (PCP)


Primary Care Physician








KEVIN GRIMES (Family)


Primary Care Physician


Patient Instructions:  Lactic Acid Blood Test





Add. Discharge Instructions:  


1.  Check your blood sugar twice a day for the next 5 days.  If they are over 

200 then start the Metformin.  If below 200 consistently then you do not need to

UNLESS otherwise directed by [facility].  Take the blood pressure 

medication as directed.  Return to ER for any worsening.


Scripts


Metformin HCl (Metformin HCl) 500 Mg Tablet


500 MG PO BID, #14 TAB


   Prov: MEAGAN RICKS         10/15/21 


Metoprolol Succinate (Toprol Xl) 50 Mg Tab.er.24h


50 MG PO DAILY, #20 TAB


   Prov: MEAGAN RICKS         10/15/21








ATTENDING PHYSICIAN NOTE:


I WAS PHYSICALLY PRESENT AS ER PHYSICIAN WHEN THIS PATIENT WAS IN ER, BUT I WAS 

NOT INVOLVED IN DECISION MAKING OR ANY CARE OF THIS PATIENT. 


 (REGINALDGINNY PETER J APRN             Oct 15, 2021 15:32


GINNY MONTELONGO DO                 Oct 17, 2021 20:21

## 2021-10-15 NOTE — DIAGNOSTIC IMAGING REPORT
INDICATION: Fever and chills.



COMPARISON: 08/05/2020.



FINDINGS: Single view of the chest demonstrates clear lungs

bilaterally. The heart is normal. There is no pneumothorax.

Osseous structures are normal.



IMPRESSION: Negative chest.



Dictated by: 



  Dictated on workstation # [id number]

## 2022-02-13 ENCOUNTER — HOSPITAL ENCOUNTER (EMERGENCY)
Dept: HOSPITAL 75 - ER | Age: 32
Discharge: HOME | End: 2022-02-13
Payer: COMMERCIAL

## 2022-02-13 VITALS — HEIGHT: 69.02 IN | WEIGHT: 279.99 LBS | BODY MASS INDEX: 41.47 KG/M2

## 2022-02-13 VITALS — SYSTOLIC BLOOD PRESSURE: 148 MMHG | DIASTOLIC BLOOD PRESSURE: 98 MMHG

## 2022-02-13 DIAGNOSIS — F41.9: ICD-10-CM

## 2022-02-13 DIAGNOSIS — L03.213: ICD-10-CM

## 2022-02-13 DIAGNOSIS — I10: ICD-10-CM

## 2022-02-13 DIAGNOSIS — H11.31: ICD-10-CM

## 2022-02-13 DIAGNOSIS — H00.011: Primary | ICD-10-CM

## 2022-02-13 DIAGNOSIS — Z79.899: ICD-10-CM

## 2022-02-13 DIAGNOSIS — E11.9: ICD-10-CM

## 2022-02-13 DIAGNOSIS — Z79.84: ICD-10-CM

## 2022-02-13 PROCEDURE — 99281 EMR DPT VST MAYX REQ PHY/QHP: CPT

## 2022-02-13 NOTE — ED EENT
History of Present Illness


General


Chief Complaint:  Eye Problems


Stated Complaint:  R EYE PAIN; SWELLING; BRUISING; REDNESS


Nursing Triage Note:  


Pt ambulates to ED 7 with c/o waking up with right eye 


pain/bruising/swelling/redness. Right side of sclera is red. Symptoms started 


Monday, he went to clinic and was prescribed eye drops. Went back Friday and was




told he has a "volcano" in his eye and was made an appt with Dr Berman for next 


Tuesday.


Source:  patient





History of Present Illness


Date Seen by Provider:  Feb 13, 2022


Time Seen by Provider:  04:34


Initial Comments


PT ARRIVES VIA POV FROM HOME


STATES HE WOKE UP MONDAY 02/07/22 WITH PAIN TO RIGHT EYE


PT BEGAN TO HAVE INCREASED PAIN TO EYE AND UPPER EYELID STARTED TO SWELL, SO 

WENT TO [facility] ON WEDNESDAY AND WAS PRESCRIBED AN UNKNOWN EYE 

DROP


STATES HE WENT BACK TO [facility] ON FRIDAY BECAUSE OF INCREASED 

PAIN AND SWELLING TO THE EYELID AND FEELING LIKE SOMETHING WAS ON THE INSIDE OF 

HIS UPPER LID ON THE OUTER ASPECT. 


PT WAS PRESCRIBED KEFLEX 250 MG AND ERYTHROMYCIN EYE OINTMENT, AND REFERRED TO 

DR.. GRIDER--[facility] MADE AND  APPOINTMENT FOR PT TO SEE HIM ON TUESDAY 

02/15/21 ( THIS IS PT'S REGULAR EYE DR. --MISSED HIS REGULAR EXAM IN JANUARY, 

AND PT HAS NOT ATTEMPTED TO CONTACT HIS OFFICE AT ANY TIME THIS WEEK FOR THIS 

PROBLEM)


STATES HIS EYE IS FEELING MUCH BETTER--DECREASED PAIN, DECREASED SWELLING


WOKE UP AT 0100 THIS AM, AND NOTICED THAT THE OUTER PART OF HIS RIGHT EYEBALL 

WAS NOW RED AND HIS RIGHT UPPER EYELID IS BRUISED


NO INJURY TO THE AREA


PT DOES ADMIT TO FREQUENTLY RUBBING HIS EYE, AND FREQUENTLY EVERTING HIS RIGHT 

UPPER EYELID, ESPECIALLY THE LATERAL ASPECT--HAS NOTICED A BUMP UNDER HIS 

EYELID, LATERAL ASPECT--REPORTS HE HAS BEEN LOOKING AT IT MULTIPLE TIMES A DAY


HE STATES THAT THE BUMP IS GETTING SMALLER





NO FEVER


NO CHANGES IN VISION--PT WEARS GLASSES


NO HEADACHE


NO DRAINAGE OR MATTING OF THE EYE. 


NO URI SYMPTOMS OR RECENT ILLNESS


NO RASH TO THE AREA


NO PAIN TO EYEBALL ITSELF





PT IS NOT ON ASPIRIN OR BLOOD THINNERS


PT IS DIABETIC. 





NO PRIOR INJURIES OR DISEASE OF THE EYE, OTHER THAN REGULAR GLASSES





PCP: [facility]


OPTOMETRIST: DR. GRIDER





Allergies and Home Medications


Allergies


Coded Allergies:  


     No Known Drug Allergies (Unverified , 8/5/20)





Patient Home Medication List


Home Medication List Reviewed:  Yes


Canagliflozin (Invokana) 100 Mg Tablet, Unknown Dose PO, (Reported)


   Entered as Reported by: MANOHAR DELCID on 8/9/20 2151


Lisinopril (Lisinopril) 5 Mg Tablet, Unknown Dose PO DAILY, (Reported)


   Entered as Reported by: MANOHAR DELCID on 8/9/20 2151


Lorazepam (Ativan) 0.5 Mg Tablet, 0.5 MG PO BID PRN for ANXIETY


   Prescribed by: MEAGAN RICKS on 8/5/20 2205


Lorazepam (Ativan) 0.5 Mg Tablet, (Reported)


   Entered as Reported by: MANOHAR DELCID on 8/9/20 2145


Metformin HCl (Metformin HCl) 500 Mg Tablet, Unknown Dose PO, (Reported)


   Entered as Reported by: MANOHAR DELCID on 8/9/20 2151


Metformin HCl (Metformin HCl) 500 Mg Tablet, 500 MG PO BID


   Prescribed by: MEAGAN RICKS on 10/15/21 1812


Metoprolol Succinate (Toprol Xl) 50 Mg Tab.er.24h, 50 MG PO DAILY


   Prescribed by: MEAGAN RICKS on 10/15/21 1812





Review of Systems


Review of Systems


Constitutional:  no symptoms reported


Eyes:  See HPI; Denies Blurred Vision, Denies Drainage, Denies Decreased Acuity,

Denies Foreign Body Sensation; Inflammation, Pain; Denies Photophobia, Denies 

Previous Injury, Denies Shadows, Denies Tunnel Vision, Denies Vision Changes; 

Glasses


Nose:  no symptoms reported


Mouth:  no symptoms reported





Past Medical-Social-Family Hx


Patient Social History


Tobacco Use?:  No


Use of E-Cig and/or Vaping dev:  No


Substance use?:  No


Alcohol Use?:  Yes


Alcohol Frequency:  Once in a while


Pt feels they are or have been:  No





Immunizations Up To Date


Tetanus Booster (TDap):  Unknown


Influenza Vaccine Up-to-Date:  No; Not Current


First/Initial COVID19 Vaccinat:  Jan 2021


Second COVID19 Vaccination [place]:  Feb 2021


COVID19 Vaccine :  Moderna





Seasonal Allergies


Seasonal Allergies:  No





Past Medical History


Surgeries:  No


Respiratory:  No


Cardiac:  Yes


High Cholesterol, Hypertension


Neurological:  No


Genitourinary:  No


Gastrointestinal:  No


Musculoskeletal:  No


Endocrine:  Yes


Diabetes, Non-Insulin dep


HEENT:  No


Cancer:  No


Psychosocial:  Yes


Anxiety


Integumentary:  No


Blood Disorders:  No





Physical Exam


Vital Signs





Vital Signs - First Documented








 2/13/22





 04:27


 


Temp 35.5


 


Pulse 90


 


Resp 18


 


B/P (MAP) 148/98 (115)


 


Pulse Ox 97


 


O2 Delivery Room Air








Height, Weight, BMI


Height: '"


Weight: lbs. oz. kg; 41.00 BMI


Method:


General Appearance:  WD/WN, no apparent distress


Eyes:  right eye PERRL, right eye EOMI, right eye other (VERY MILD SWELLING AND 

FAINT ERYTHEMA TO RIGHT UPPER LID. VERY SMALL BRUISE TO LATERAL ASPECT OF RIGHT 

UPPER LID, JUST ABOVE THE LASH LINE.   SUBCONJUNCTIVAL HEMORRHAGE NOTED TO 

LATERAL ASPECT OF RIGHT EYE.   REMAINDER OF CONJUNCTIVA IS CLEAR. NO DRAINAGE OR

EXCESSIVE WATERING. NO PHOTOPHOBIA. NO PAIN WITH EYE MOVEMENT. STYE IS PRESENT 

TO THE UNDERSIDE AND LATERAL ASPECT OF RIGHT UPPER EYELID. NO DRAINAGE.  NO BONY

TENDERNESS. ); left eye normal inspection


Neurologic/Psychiatric:  CNs II-XII nml as tested, no motor/sensory deficits, 

alert, normal mood/affect, oriented x 3


Skin:  normal color (PT IS . ), warm/dry





Procedures/Interventions


Eye :  


   Location:  right eye


   Anesthesia (gtts):  Tetracaine


Progress/Procedure Conclusion


FLUORESCEIN STAIN--NO ABRASIONS OR DYE UPTAKE.





Progress/Results/Core Measures


Results/Orders


My Orders





Orders - GINNY MONTELONGO DO


Tetracaine  0.5% Ophth Sol Sdv (Tetracai (2/13/22 04:45)


Fluorescein Strips (Fluor-I-Strips) (2/13/22 04:45)


Balanced Salt Irrigation Soln (Bss Irrig (2/13/22 04:45)





Medications Given in ED





Current Medications








 Medications  Dose


 Ordered  Sig/Neal


 Route  Start Time


 Stop Time Status Last Admin


Dose Admin


 


 Balanced Salt


 Solution  15 ml  ONCE  ONCE


 IR  2/13/22 04:45


 2/13/22 04:46 DC 2/13/22 04:46


15 ML


 


 Fluorescein Sodium  1 mg  ONCE  ONCE


 OU  2/13/22 04:45


 2/13/22 04:46 DC 2/13/22 04:46


1 MG


 


 Tetracaine HCl  4 ml  ONCE  ONCE


 OU  2/13/22 04:45


 2/13/22 04:46 DC 2/13/22 04:46


4 ML








Vital Signs/I&O











 2/13/22





 04:27


 


Temp 35.5


 


Pulse 90


 


Resp 18


 


B/P (MAP) 148/98 (115)


 


Pulse Ox 97


 


O2 Delivery Room Air














Blood Pressure Mean:                    115











Departure


Impression





   Primary Impression:  


   Hordeolum of right upper eyelid


   Additional Impressions:  


   Subconjunctival hemorrhage of right eye


   Preseptal cellulitis of right upper eyelid


Disposition:  01 HOME, SELF-CARE


Condition:  Stable





Departure-Patient Inst.


Decision time for Depature:  04:50


Referrals:  


[facility]/ISAIAH (PCP)


Primary Care Physician








KEVIN GRIMES (Family)


Primary Care Physician








IZAIAH DALAL OD


Patient Instructions:  Molly (CHRISTIAN), Preseptal Cellulitis ED, Subconjunctival 

Hemorrhage





Add. Discharge Instructions:  


CONTINUE YOUR ORAL ANTIBIOTICS BUT INCREASE THE DOSE  MG 4 TIMES A 

DAY--TAKE  MG TABLETS 4 TIMES A DAY





CONTINUE ERYTHROMYCIN EYE OINTMENT EVERY 4 HOURS





DO NOT RUB EYE, DO NOT TOUCH YOUR EYELID OR ANY PART OF YOUR EYE AREA, EXCEPT TO

PUT IN MEDICATION





TYLENOL AND MOTRIN AS NEEDED FOR PAIN 





FOLLOW UP WITH DR. GRIDER ON TUESDAY AS SCHEDULED. 





All discharge instructions reviewed with patient and/or family. Voiced 

understanding.











GINNY MONTELONGO DO                 Feb 13, 2022 04:54
